# Patient Record
Sex: MALE | Race: WHITE | NOT HISPANIC OR LATINO | Employment: FULL TIME | URBAN - METROPOLITAN AREA
[De-identification: names, ages, dates, MRNs, and addresses within clinical notes are randomized per-mention and may not be internally consistent; named-entity substitution may affect disease eponyms.]

---

## 2019-02-07 ENCOUNTER — OFFICE VISIT - HEALTHEAST (OUTPATIENT)
Dept: FAMILY MEDICINE | Facility: CLINIC | Age: 29
End: 2019-02-07

## 2019-02-07 DIAGNOSIS — K64.9 HEMORRHOIDS, UNSPECIFIED HEMORRHOID TYPE: ICD-10-CM

## 2019-02-07 DIAGNOSIS — Z00.00 ROUTINE MEDICAL EXAM: ICD-10-CM

## 2019-02-07 ASSESSMENT — MIFFLIN-ST. JEOR: SCORE: 1772.75

## 2020-01-10 ENCOUNTER — COMMUNICATION - HEALTHEAST (OUTPATIENT)
Dept: SCHEDULING | Facility: CLINIC | Age: 30
End: 2020-01-10

## 2020-01-13 ENCOUNTER — COMMUNICATION - HEALTHEAST (OUTPATIENT)
Dept: FAMILY MEDICINE | Facility: CLINIC | Age: 30
End: 2020-01-13

## 2020-01-13 DIAGNOSIS — Z11.3 SCREEN FOR SEXUALLY TRANSMITTED DISEASES: ICD-10-CM

## 2020-01-17 ENCOUNTER — COMMUNICATION - HEALTHEAST (OUTPATIENT)
Dept: SCHEDULING | Facility: CLINIC | Age: 30
End: 2020-01-17

## 2020-01-17 ENCOUNTER — AMBULATORY - HEALTHEAST (OUTPATIENT)
Dept: LAB | Facility: CLINIC | Age: 30
End: 2020-01-17

## 2020-01-17 DIAGNOSIS — Z11.3 SCREEN FOR SEXUALLY TRANSMITTED DISEASES: ICD-10-CM

## 2020-01-17 LAB — HIV 1+2 AB+HIV1 P24 AG SERPL QL IA: NEGATIVE

## 2021-06-02 VITALS — WEIGHT: 174.3 LBS | HEIGHT: 71 IN | BODY MASS INDEX: 24.4 KG/M2

## 2021-06-05 NOTE — TELEPHONE ENCOUNTER
Reached out to patient and left a message asking that he contact me at: [phone] to assist with scheduling a lab only appointment.  Thank you, Constance Cordon

## 2021-06-05 NOTE — TELEPHONE ENCOUNTER
L testicle pain and scrotal swelling since Monday       No trauma / injury       Yes swelling of the scrotum      A/P:   > Should be seen at ED now       He will go       Jake Rios RN   Triage and Medication Refills          Reason for Disposition    Scrotum is painful or tender to touch    Protocols used: SCROTUM SWELLING-A-AH

## 2021-06-05 NOTE — TELEPHONE ENCOUNTER
Patient calling -says he was told his HIV test results were in.  Confirmed with patient HIV results from today show negative.  Advised patient that if there is more to this test and/or result, his provider will review it and someone will contact him with results via phone or mail.    Niesha Powers RN  Triage Nurse Advisor    Reason for Disposition    [1] Other NON-URGENT information for PCP AND [2] does not require PCP response    Protocols used: PCP CALL - NO TRIAGE-A-

## 2021-06-09 ENCOUNTER — OFFICE VISIT - HEALTHEAST (OUTPATIENT)
Dept: FAMILY MEDICINE | Facility: CLINIC | Age: 31
End: 2021-06-09

## 2021-06-09 DIAGNOSIS — R20.0 NUMBNESS AND TINGLING IN LEFT ARM: ICD-10-CM

## 2021-06-09 DIAGNOSIS — M54.12 CERVICAL RADICULOPATHY: ICD-10-CM

## 2021-06-09 DIAGNOSIS — M62.838 TRAPEZIUS MUSCLE SPASM: ICD-10-CM

## 2021-06-09 DIAGNOSIS — R20.2 NUMBNESS AND TINGLING IN LEFT ARM: ICD-10-CM

## 2021-06-09 DIAGNOSIS — M25.512 TRIGGER POINT OF SHOULDER REGION, LEFT: ICD-10-CM

## 2021-06-09 RX ORDER — CYCLOBENZAPRINE HCL 5 MG
5-10 TABLET ORAL 3 TIMES DAILY PRN
Qty: 30 TABLET | Refills: 0 | Status: SHIPPED | OUTPATIENT
Start: 2021-06-09 | End: 2023-03-14

## 2021-06-16 ENCOUNTER — COMMUNICATION - HEALTHEAST (OUTPATIENT)
Dept: FAMILY MEDICINE | Facility: CLINIC | Age: 31
End: 2021-06-16

## 2021-06-16 DIAGNOSIS — M62.838 TRAPEZIUS MUSCLE SPASM: ICD-10-CM

## 2021-06-16 DIAGNOSIS — M54.12 CERVICAL RADICULOPATHY: ICD-10-CM

## 2021-06-16 DIAGNOSIS — R20.0 NUMBNESS AND TINGLING IN LEFT ARM: ICD-10-CM

## 2021-06-16 DIAGNOSIS — M25.512 TRIGGER POINT OF SHOULDER REGION, LEFT: ICD-10-CM

## 2021-06-16 DIAGNOSIS — R20.2 NUMBNESS AND TINGLING IN LEFT ARM: ICD-10-CM

## 2021-06-16 PROBLEM — K64.9 HEMORRHOIDS, UNSPECIFIED HEMORRHOID TYPE: Status: ACTIVE | Noted: 2019-02-07

## 2021-06-20 ENCOUNTER — HEALTH MAINTENANCE LETTER (OUTPATIENT)
Age: 31
End: 2021-06-20

## 2021-06-23 NOTE — PROGRESS NOTES
MALE PREVENTATIVE EXAM    Assessment and Plan:       1. Routine medical exam    Generally the patient is doing very well.  We discussed healthy lifestyles, including adequate exercise (3-4 times a week for 20-30 minutes), and a healthy diet.  Patient should return for annual physicals, and we can also see them here as needed.       2. Hemorrhoids, unspecified hemorrhoid type    I reassured him that I think that this is pretty minor and he can use some topical agents on this as needed.  He is very comfortable with this explanation and will let me know what seems to be getting worse instead of better.        Next follow up:  No Follow-up on file.    Immunization Review  Adult Imm Review: No immunizations due today    I discussed the following with the patient:   Adult Healthy Living: Importance of regular exercise  Healthy nutrition  Getting adequate sleep  Stress management  Use of seat belts      Subjective:   Chief Complaint: Marvin Graves is an 28 y.o. male here for a preventative health visit.     HPI: 28-year-old who is here today for a get acquainted visit and a physical.  He is generally in very good health.  He describes some mild rectal bleeding that happens only very occasionally.  It is always bright red in color and very short lasting.  He associates it with straining a bit too much of the stool and/or drinking a little bit too much alcohol though couple of days prior.    Generally otherwise he is in good health has no other questions or concerns today.    Patient is new patient to the clinic. Please see past medical history, surgical history, social history and family history, all of which were completed in their entirety today.     Healthy Habits  Are you taking a daily aspirin? No  Do you typically exercising at least 40 min, 3-4 times per week?  NO  Do you usually eat at least 4 servings of fruit and vegetables a day, include whole grains and fiber and avoid regularly eating high fat foods?  "Yes  Have you had an eye exam in the past two years? Yes  Do you see a dentist twice per year? Yes  Do you have any concerns regarding sleep? YES, has always had trouble falling and staying asleep    Safety Screen  If you own firearms, are they secured in a locked gun cabinet or with trigger locks? Yes  No Data Recorded    Review of Systems:  Please see above.  The rest of the review of systems are negative for all systems.     Cancer Screening     Patient has no health maintenance due at this time          Patient Care Team:  Jass Anguiano MD as PCP - General (Family Medicine)        History     Reviewed By Date/Time Sections Reviewed    Jass Anguiano MD 2/7/2019 12:52 PM Medical, Surgical, Tobacco, Alcohol, Drug Use, Sexual Activity, Family            Objective:   Vital Signs:   Visit Vitals  BP 92/62 (Patient Site: Left Arm, Patient Position: Sitting, Cuff Size: Adult Regular)   Pulse 72   Ht 5' 11\" (1.803 m)   Wt 174 lb 4.8 oz (79.1 kg)   BMI 24.31 kg/m           PHYSICAL EXAM    Well developed, well nourished, no acute distress.  HEENT: normocephalic/atraumatic, PERRLA/EOMI, TMs: Gray, normal light reflex, no nasal discharge.  Oral mucosa: no erythema/exudate  Neck: No LAD/masses/thyromegaly/bruits  Lungs: clear bilaterally  Heart: regular rate and rhythm, no murmurs/gallops/rubs.  Abdomen: Normal bowel sounds, soft, non-tender, non-distended, no masses, neg Bojorquez's/McBurney's, no rebound/guarding  Lymphatics: no supraclavicular/axillary/epitrochlear/inguinal LAD. No edema.  Neuro: A&O x 3, CN II-XII intact, strength 5/5, reflexes symmetric, sensory intact to light touch.  Psych: Behavior appropriate, engaging.  Thought processes congruent, non-tangential.  Musculoskeletal: no gross deformities.  Skin: no rashes or lesions.            Medication List      as of 2/7/2019  1:50 PM     You have not been prescribed any medications.         Additional Screenings Completed Today:     "

## 2021-06-28 ENCOUNTER — HOSPITAL ENCOUNTER (OUTPATIENT)
Dept: PHYSICAL MEDICINE AND REHAB | Facility: CLINIC | Age: 31
Discharge: HOME | End: 2021-06-28
Attending: FAMILY MEDICINE
Payer: COMMERCIAL

## 2021-06-28 ENCOUNTER — OFFICE VISIT - HEALTHEAST (OUTPATIENT)
Dept: PHYSICAL THERAPY | Facility: REHABILITATION | Age: 31
End: 2021-06-28

## 2021-06-28 DIAGNOSIS — M54.12 CERVICAL RADICULITIS: ICD-10-CM

## 2021-06-28 DIAGNOSIS — R20.2 PARESTHESIA: ICD-10-CM

## 2021-06-28 DIAGNOSIS — M79.18 MYOFASCIAL PAIN: ICD-10-CM

## 2021-06-28 ASSESSMENT — MIFFLIN-ST. JEOR: SCORE: 1821.28

## 2021-07-06 VITALS
WEIGHT: 185 LBS | BODY MASS INDEX: 25.8 KG/M2 | DIASTOLIC BLOOD PRESSURE: 72 MMHG | OXYGEN SATURATION: 99 % | SYSTOLIC BLOOD PRESSURE: 131 MMHG | RESPIRATION RATE: 16 BRPM | HEART RATE: 73 BPM

## 2021-07-06 VITALS — BODY MASS INDEX: 25.9 KG/M2 | WEIGHT: 185 LBS | HEIGHT: 71 IN

## 2021-07-07 NOTE — PATIENT INSTRUCTIONS - HE
[facility] Scheduling    Please call [phone] to schedule your image(s) (select option #1). There are 2 different locations, see below. You can do walk-in visits for xray only images if you want.     [facility] [place]  [address] [place], [place] [id number]      [place] [id number] [facility] [address]    An order for physical therapy has been provided today.  Someone will call you to schedule physical therapy or you can call [phone] to schedule physical therapy.  It will be very important for you to do your physical therapy exercises on a regular basis to decrease your pain and prevent future flares of pain.

## 2021-07-07 NOTE — PROGRESS NOTES
[facility]   Cervical Thoracic Initial Evaluation    Patient Name: Marvin Graves  Date of evaluation: 6/28/2021  Referral Diagnosis: Cervical radiculitis  Referring provider: Amelia Elder PA-C  Visit Diagnosis:     ICD-10-CM    1. Cervical radiculitis  M54.12        Assessment:       Marvin is a 30 year old male who woke up one morning March 2021 with left neck and left arm pain and tingling intermittently to the left thumb. He reports neck extension, left neck rotation, and arms length reaching all increase left arm symptoms and if he does not modify position they extend to his left thumb. Patient denies any weakness of the left upper extremity, and denies any injury or previous left neck or shoulder problem. Patient does have a + left Sperlings test. Left UE tingling is produced with neck extension and left rotation- both reduced in AROM. Patient is able to resolve his symptoms by changing his position, and denies any interference with sleep.  Pt. is a good candidate for skilled PT services to improve pain levels and function.    Goals:  Pt. will demonstrate/verbalize independence in self-management of condition in : 6 weeks    Pt will: have no left UE symptoms of pain or tingling in 6 weeks      Patient's expectations/goals are realistic.    Barriers to Learning or Achieving Goals:  No Barriers.    Goals and plan of care were set in collaboration with patient. Plan of care is dynamic and will be modified on an ongoing basis.     Plan / Patient Instructions:        Plan of Care:   Authorization / Certification Start Date: 06/28/21  Authorization / Certification Number of Visits: 8  Communication with: Referral Source  Patient Related Instruction: Nature of Condition;Treatment plan and rationale;Self Care instruction;Basis of treatment;Body mechanics;Posture;Precautions;Expected outcome  Times per Week: 1  Number of Weeks: 6  Number of Visits: 6  Discharge Planning: Self management,  HEP  Precautions / Restrictions : None known  Therapeutic Exercise: Stretching;Strengthening  Neuromuscular Reeducation: posture;kinesio tape  Manual Therapy: soft tissue mobilization;myofascial release;strain counterstrain  Functional Training (ADL's): self care;ADL's;ergonomics  Equipment: theraband      Plan for next visit: Perform Adson's and Monserrat test to further exam for TOS symptoms- check shoulder strength as well. Patient may have fascial dysfunction of the left lower rib cage that may need treatment. 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 male who presents to therapy today with complaints of left neck and upper extremity pain and tingling into left thumb intermittently . Date of onset/duration of symptoms is March 2021 Onset was sudden. Symptoms are slightly less intense the last 2 weeks. He denies history of similar symptoms. He describes their previous level of function as not limited    Pain Rating:3  Pain rating at best: 0  Pain rating at worst: 4  Pain description: Left upper trap and down the outside of the shoulder and down the outside of the arm to thumb intermittently, tingling also intermittently in the upper arm in same pattern. No previous injury - shoulder or neck. Played football in high school    No past medical history on file.     No past surgical history on file.     Functional limitations are described as occurring with:   Reaching at arms length  Look up at ceiling  Turning head to the left      Patient reports previous chiropractic visit x 2 , steroid injection to left shoulder blade. Patient using a mm relaxant - equivocal relief, but less frequently down the left arm.          Objective:      Note: Items left blank indicates the item was not performed or not indicated at the time of the evaluation.    Patient Outcome Measures :    Neck Disability Score in %: 12     Scores range from 0-100%, where a score of 0% represents minimal pain and maximal function. The minmal clinically important difference is a score reduction of 10%.    Cervical Thoracic Examination  1. Cervical radiculitis       Involved side: Left  Posture Observation:     WNL, but left scapula is depressed and protracted, pelvis level    Cervical ROM:    Date:      *Indicate scale AROM AROM AROM   Cervical Flexion Nl, 2 fingers chin to chest     Cervical Extension 50% loss with pain and tingling into left UE during position      Right Left Right Left Right Left   Cervical Sidebending         Cervical Rotation 74 deg 55 deg with sx into left arm       Cervical Protraction      Cervical Retraction      Thoracic Flexion      Thoracic Extension      Thoracic Sidebending         Thoracic Rotation           Strength     Date:      Cervical Myotomes/5 Right Left Right Left Right Left   Cervical Flexion (C1-2)         Cervical Sidebending (C3)         Shoulder Elevation (C4)         Shoulder Abduction (C5)         Elbow Flexion (C6)         Elbow Extension (C7)         Wrist Flexion (C7) 5 5       Wrist Extension (C6) 5 5       Thumb abduction (C8) 5 5       Finger Abduction (T1)           Sensation       Reflex Testing  Cervical Dermatomes Right Left UE Reflexes Right Left   Back of the Head (C2)   Biceps (C5-6)     Supraclavicular Fossa (C3)   Brachioradialis (C5-6)     AC Joint (C4)   Triceps (C7-8)     Lateral Biceps (C5)  nl Ke s test     Palmar Thumb (C6)  nl LE Reflexes     Palmar 3rd Finger (C7)  nl Patellar (L3-4)     Palmar 5th Finger (C8)   Achilles (S1-2)     Ulnar Forearm (T1)   Babinski Response         Palpation: Cranial scan + cervical PLL on left , negative sinuvertebral of neck, negative somatic Left UE  neural and- check ribs    Cervical Special Tests     Cervical Special Tests Right Left UE Nerve Mobility Right Left   Cervical compression   Median nerve     Cervical distraction   Ulnar nerve     Spurling s test  + Radial nerve     Shoulder abduction sign   Thoracic outlet     Deep neck flexor endurance test   Monserrat     Upper cervical rotation   Adson s     Sharper-Tim   Cervical rotation lateral flexion     Alar ligament test   Other:     Other:   Other:       UE Screen: WFL    Treatment Today     TREATMENT MINUTES COMMENTS   Evaluation 30 Neck   Self-care/ Home management 14 Discussed ergonomics and sleep positioning    Manual therapy     Neuromuscular Re-education     Therapeutic Activity     Therapeutic Exercises 16 Issued and performed neck extension in standing and supine  Exercises:  Exercise #1: Standing neck extension and retraction- as tolerated and avoiding left UE sx- 2x/hour  Comment #1: Supine Neck extension on bed- as tolerated and avoiding left UE sx- 1-5 min , 2x/day      Gait training     Modality__________________                Total 60    Blank areas are intentional and mean the treatment did not include these items.     PT Evaluation Code: (Please list factors)  Patient History/Comorbidities: Neck and Left arm tingling  Examination: Neck  Clinical Presentation: stable   Clinical Decision Making: low    Patient History/  Comorbidities Examination  (body structures and functions, activity limitations, and/or participation restrictions) Clinical Presentation Clinical Decision Making (Complexity)   No documented Comorbidities or personal factors 1-2 Elements Stable and/or uncomplicated Low   1-2 documented comorbidities or personal factor 3 Elements Evolving clinical presentation with changing characteristics Moderate   3-4 documented comorbidities or personal factors 4 or more Unstable and unpredictable High             Raysa Lmeon, PT  6/28/2021  1:30 PM

## 2021-07-22 NOTE — PROGRESS NOTES
ASSESSMENT: Marvin Graves is a 30 y.o. male who is otherwise healthy who presents today for new patient evaluation of a 3-month history of left neck pain and left upper extremity paresthesias.  Patient has not had any advanced imaging of the cervical spine.  Apparently he had x-rays at a chiropractor which showed some subluxation at multiple levels.  I am concerned that the patient has a disc bulge with left C6 compromise based on the distribution of his symptoms.  Symptoms are interfering with his ability to work as a .  Symptoms have been refractory to conservative treatments thus far including chiropractic treatment x2, trigger point injection, home exercise program prescribed by a DO, and muscle relaxers.  Within the differential is also thoracic outlet syndrome as he does have increased symptoms with overhead reaching, but I think this is less likely as he can also reproduce upper extremity symptoms with cervical extension alone and left lateral rotation of the neck.      NDI: 14  Who 5: 18    PLAN:  A shared decision making model was used.  The patient's values and choices were respected.  The following represents what was discussed and decided upon by the physician assistant and the patient.      1.  DIAGNOSTIC TESTS: I ordered an MRI cervical spine for further evaluation.  -If the MRI cervical spine does not show left C6 compromise, I recommend an EMG left upper extremity versus additional imaging of the left thoracic outlet/brachial plexus.    2.  PHYSICAL THERAPY:  Discussed the importance of core strengthening, ROM, stretching exercises with the patient and how each of these entities is important in decreasing pain.  Explained to the patient that the purpose of physical therapy is to teach the patient a home exercise program.  These exercises need to be performed every day in order to decrease pain and prevent future occurrences of pain.   I entered a referral to physical  therapy.    3.  MEDICATIONS:  -I offered gabapentin.  He declined for now.  If symptoms persist, we can trial this medication.  -Patient can continue take cyclobenzaprine as needed.  -Of note, patient is planning on donating a kidney to his sister in the near future, so we will avoid medications which could affect kidney function.    4.  INTERVENTIONS: No interventions were ordered.  -We will await the results of the MRI.  If there is left C6 impingement and patient fails to improve with conservative measures, I think he could benefit from an epidural steroid injection.  -Patient had a trigger point injection at his primary care clinic June 9, 2021 which provided some improvement in the muscle tension in his left neck, but no change in his paresthesias.  We could try additional trigger point injections if needed.  He does appear to have a large component of myofascial pain.  -We can also consider osteopathic manipulation if symptoms persist.    5.  PATIENT EDUCATION: Patient is in agreement the above plan.  All questions were answered.    6.  FOLLOW-UP:   A nurse will call the patient with the results of the MRI cervical spine.  At that time I would likely recommend a trial of physical therapy for 4 weeks versus follow-up with me to review the results if he has questions or concerns in the meantime, he should not hesitate to call.        SUBJECTIVE:  Marvin Graves  Is a 30 y.o. male who presents today in consultation at request of Dr. Manzanares for new patient evaluation of left neck pain and left upper extremity paresthesias.  Patient reports that he has always had some chronic neck muscle tension/knots.  Ibuprofen was historically helpful for his symptoms.  3 months ago he woke up with more severe left neck pain and new tingling down the left arm.  He went to 2 sessions of chiropractic treatment which were not helpful.  He had an x-ray at the chiropractic clinic which apparently showed some subluxations.  He then  saw Dr. Manzanares and had a trigger point injection June 9 which helped somewhat with muscle tension, but did not provide relief of his paresthesias.  The paresthesias are bothersome at work.  He is a  and has to do a lot of overhead reaching/work and cervical extension which reproduces the symptoms.  Left neck pain.    Patient complains of pain is located in the left lower cervical region and extends into the upper trapezius muscle.  He denies any pain down the arm but he has numbness and tingling that radiates from the left shoulder, down the left bicep/lateral upper arm into the left radial forearm, ending in the left thumb.  He can reproduce the paresthesias down the arm with cervical extension and reaching his left arm overhead.  Turning his neck aggravates the pinching pain in the left neck.  Symptoms are alleviated with keeping his arms down in his neck neutral.  Patient denies any weakness in the left arm.  He denies any right-sided symptoms.  He denies any headaches.  He denies increased paresthesias at night.  He denies any color or temperature changes in the left upper extremity.  Denies any swelling left upper extremity.  He denies any loss of bowel or bladder control.  Denies any recent fevers, chills, or sweats.    Patient was given a home exercise program by Dr. Manzanares.  He states that the exercises are similar to stretch as he was already doing on his own so he has not noticed any significant improvement.  As mentioned above, he tried chiropractic treatment x2.  He tried a trigger point injection June 9.  He has never had any spine surgery.  He uses Flexeril which helps somewhat.  He is not taking any additional pain relief medications.    Current Outpatient Medications on File Prior to Visit   Medication Sig Dispense Refill     cyclobenzaprine (FLEXERIL) 5 MG tablet Take 1-2 tablets (5-10 mg total) by mouth 3 (three) times a day as needed for muscle spasms. 30 tablet 0         No  Known Allergies        Patient Active Problem List   Diagnosis     Hemorrhoids, unspecified hemorrhoid type       Surgical history: Finger surgery 2008    Family History   Problem Relation Age of Onset     No Medical Problems Mother      Cancer Father         CLL     Diabetes Paternal Grandmother      No Medical Problems Paternal Grandfather        Social history: Patient is engaged.  He works at [employer] as a .  He denies tobacco use.  He drinks several alcoholic beverages per week.  Denies illicit drug use.    ROS:    Specifically negative for dysphagia, imbalance, fine motor skill difficulties, bowel/bladder dysfunction, fevers,chills, appetite changes, unexplained weight loss.   Otherwise 13 systems reviewed are negative.  Please see the patient's intake questionnaire from today for details.      OBJECTIVE:  PHYSICAL EXAMINATION:  CONSTITUTIONAL:  Vital signs as above.  No acute distress.  The patient is well nourished and well groomed.  PSYCHIATRIC:  The patient is awake, alert, oriented to person, place, time and answering questions appropriately with clear speech.    HEENT:  Normocephalic, atraumatic.  Sclera clear.    SKIN:  Skin over the face, bilateral upper extremities, and neck is clean, dry, intact without rashes.  LYMPH NODES:  No palpable or tender anterior/posterior cervical, submandibular, or supraclavicular lymph nodes.    MUSCLE STRENGTH:  5/5 strength for the bilateral shoulder abductors, elbow flexors/extensors, wrist extensors, finger flexors/abductors.  NEURO:  CN III-XII are grossly intact.  2+ symmetric biceps, brachioradialis, triceps reflexes bilaterally.  Sensation to light touch intact bilateral upper extremities throughout.  Negative Cunningham's bilaterally.    VASCULAR:  2/4 radial pulses bilaterally.  Warm upper limbs bilaterally.  Capillary refill in the upper extremities is less than 1 second.  MUSCULOSKELETAL: Tender to palpation with hypertonicity left upper trapezius  muscles and lower left cervical paraspinous muscles.  Cervical flexion intact.  Cervical extension mildly restricted.  Reproduction of left upper extremity paresthesias with cervical extension.  Lateral rotation left mildly restricted with reproduction of paresthesias down left arm.  Lateral rotation right within normal limits.  Patient can also reproduce left upper extremity paresthesias with raising his left arm overhead.  Range of motion of left shoulder is full.

## 2021-07-27 ENCOUNTER — HOSPITAL ENCOUNTER (OUTPATIENT)
Dept: PHYSICAL THERAPY | Facility: REHABILITATION | Age: 31
End: 2021-07-27
Payer: COMMERCIAL

## 2021-07-27 DIAGNOSIS — M54.12 CERVICAL RADICULITIS: Primary | ICD-10-CM

## 2021-07-27 PROCEDURE — 97110 THERAPEUTIC EXERCISES: CPT | Mod: GP | Performed by: PHYSICAL THERAPIST

## 2021-07-27 PROCEDURE — 97140 MANUAL THERAPY 1/> REGIONS: CPT | Mod: GP | Performed by: PHYSICAL THERAPIST

## 2021-08-03 NOTE — PROGRESS NOTES
Progress Notes by Raysa Lemon, PT at 6/28/2021  2:30 PM     Author: Raysa Lemon PT Service: -- Author Type: Physical Therapist    Filed: 8/3/2021 10:14 AM Encounter Date: 6/28/2021 Status: Signed    : Raysa Lemon PT (Physical Therapist)       [facility] Discharge Summary  Patient Name: Marvin Graves  Date: 8/3/2021  Referral Diagnosis: Cervical Radiculitis  Referring provider: Amelia Elder PA-C  Visit Diagnosis:   1. Cervical radiculitis         Goals:  Pt. will demonstrate/verbalize independence in self-management of condition in : 6 weeks    Pt will: have no left UE symptoms of pain or tingling in 6 weeks  Progress lost to follow up- pt did not continue PT care.    Patient was seen for 1 visit on 6/28/21 with no missed appointments.    Therapy will be discontinued at this time.  The patient will need a new referral to resume.    Thank you for your referral.  Raysa Lemon  8/3/2021  10:12 AM

## 2021-09-10 NOTE — ADDENDUM NOTE
Encounter addended by: Mehreen Boyd, PT on: 9/10/2021 12:04 PM   Actions taken: Clinical Note Signed, Episode resolved

## 2021-09-10 NOTE — PROGRESS NOTES
Outpatient Physical Therapy Discharge Note     Patient: Marvin Graves  : 1990    Beginning/End Dates of Reporting Period:  21 to 9/10/2021    Referring Provider: LUCILA Still    Therapy Diagnosis: Cervical Radiculitis      Client Self Report: Patient reports he has been doing ok, likely avoiding positions that increased pain but does feel they are better as well overall. has been busy and not 100% compliant with HEP but has been doing them as often as he can.     Objective Measurements:  Objective Measure: Cervical ROM   Details: pain and numbness with end range rotation and extension to the L side   Objective Measure: Neural tension tension   Details: negative for median, radial and ulnar nerves on the L side   Objective Measure: TOS testing  Details: mild positive Cody today      Goals:  Goal Identifier Self Management    Goal Description Pt. will demonstrate/verbalize independence in self-management of condition in : 6 weeks   Target Date 21   Date Met      Progress (detail required for progress note):       Goal Identifier Tingling    Goal Description Pt will: have no left UE symptoms of pain or tingling in 6 weeks   Target Date 21   Date Met      Progress (detail required for progress note):       Plan:  Discharge from therapy.    Discharge:    Reason for Discharge: Patient chooses to discontinue therapy.  Patient cancelled follow up appoint and did not return to therapy. discharge today     Equipment Issued: none   Mehreen Boyd PT, DPT, CLT-ISIDRO     Discharge Plan: Patient to continue home program.

## 2021-09-30 ENCOUNTER — LAB (OUTPATIENT)
Dept: LAB | Facility: CLINIC | Age: 31
End: 2021-09-30
Payer: COMMERCIAL

## 2021-09-30 DIAGNOSIS — F12.90 CANNABIS USE, UNSPECIFIED, UNCOMPLICATED: Primary | ICD-10-CM

## 2021-09-30 LAB
Lab: NORMAL
PERFORMING LABORATORY: NORMAL
SPECIMEN STATUS: NORMAL
TEST NAME: NORMAL

## 2021-09-30 PROCEDURE — 80307 DRUG TEST PRSMV CHEM ANLYZR: CPT | Mod: 90

## 2021-09-30 PROCEDURE — 99000 SPECIMEN HANDLING OFFICE-LAB: CPT

## 2021-10-02 LAB — MAYO MISC RESULT: NORMAL

## 2021-10-11 ENCOUNTER — HEALTH MAINTENANCE LETTER (OUTPATIENT)
Age: 31
End: 2021-10-11

## 2021-10-11 ENCOUNTER — MYC MEDICAL ADVICE (OUTPATIENT)
Dept: FAMILY MEDICINE | Facility: CLINIC | Age: 31
End: 2021-10-11

## 2021-10-13 NOTE — TELEPHONE ENCOUNTER
Patient was informed of the urine results from [facility]. This was a scanned document so that the patient would not view the results.

## 2021-10-25 DIAGNOSIS — F12.90 CANNABIS USE, UNSPECIFIED, UNCOMPLICATED: Primary | ICD-10-CM

## 2021-10-29 ENCOUNTER — LAB (OUTPATIENT)
Dept: LAB | Facility: CLINIC | Age: 31
End: 2021-10-29
Payer: COMMERCIAL

## 2021-10-29 DIAGNOSIS — F12.90 CANNABIS USE, UNSPECIFIED, UNCOMPLICATED: ICD-10-CM

## 2021-10-29 PROCEDURE — 99000 SPECIMEN HANDLING OFFICE-LAB: CPT

## 2021-10-29 PROCEDURE — 80307 DRUG TEST PRSMV CHEM ANLYZR: CPT | Mod: 90

## 2021-11-01 LAB
Lab: NORMAL
PERFORMING LABORATORY: NORMAL
SPECIMEN STATUS: NORMAL
TEST NAME: NORMAL

## 2021-11-02 LAB — MAYO MISC RESULT: NORMAL

## 2022-07-17 ENCOUNTER — HEALTH MAINTENANCE LETTER (OUTPATIENT)
Age: 32
End: 2022-07-17

## 2022-09-25 ENCOUNTER — HEALTH MAINTENANCE LETTER (OUTPATIENT)
Age: 32
End: 2022-09-25

## 2023-03-14 ENCOUNTER — OFFICE VISIT (OUTPATIENT)
Dept: FAMILY MEDICINE | Facility: CLINIC | Age: 33
End: 2023-03-14
Payer: COMMERCIAL

## 2023-03-14 VITALS
RESPIRATION RATE: 16 BRPM | OXYGEN SATURATION: 100 % | HEIGHT: 71 IN | SYSTOLIC BLOOD PRESSURE: 120 MMHG | HEART RATE: 83 BPM | WEIGHT: 178 LBS | BODY MASS INDEX: 24.92 KG/M2 | DIASTOLIC BLOOD PRESSURE: 82 MMHG | TEMPERATURE: 97.9 F

## 2023-03-14 DIAGNOSIS — M54.12 CERVICAL RADICULOPATHY: Primary | ICD-10-CM

## 2023-03-14 DIAGNOSIS — M62.838 TRAPEZIUS MUSCLE SPASM: ICD-10-CM

## 2023-03-14 PROCEDURE — 99214 OFFICE O/P EST MOD 30 MIN: CPT | Performed by: NURSE PRACTITIONER

## 2023-03-14 RX ORDER — PREDNISONE 10 MG/1
TABLET ORAL
Qty: 30 TABLET | Refills: 0 | Status: SHIPPED | OUTPATIENT
Start: 2023-03-14 | End: 2023-10-09

## 2023-03-14 RX ORDER — CYCLOBENZAPRINE HCL 5 MG
5-10 TABLET ORAL 3 TIMES DAILY PRN
Qty: 30 TABLET | Refills: 0 | Status: SHIPPED | OUTPATIENT
Start: 2023-03-14 | End: 2023-10-09

## 2023-03-14 ASSESSMENT — PAIN SCALES - GENERAL: PAINLEVEL: MILD PAIN (3)

## 2023-03-14 NOTE — PATIENT INSTRUCTIONS
I went ahead and sent the refill of the cyclobenzaprine to your pharmacy.  Watch out for sedation and no driving/alcohol with this.    I also sent the prednisone taper to see if that can help some of the discomfort symptoms.  Remember, this is best tolerated when taken with breakfast.  Potential side effects include jitteriness, upset stomach, sleep disturbances    My initial suspicion of the mass/lump in the shoulder would be a cyst given how they feel today.  If wanting definitive answers we could get an MRI.  You can continue to keep an eye on this if you want but if they become larger, more painful, etc. let me know.

## 2023-03-14 NOTE — PROGRESS NOTES
Assessment & Plan     ICD-10-CM    1. Cervical radiculopathy  M54.12 predniSONE (DELTASONE) 10 MG tablet      2. Trapezius muscle spasm  M62.838 cyclobenzaprine (FLEXERIL) 5 MG tablet          Refill of cyclobenzaprine sent to the pharmacy.  Potential for cervical radiculopathy as well.  We will try taper of prednisone to see if this provides any benefit.  Discussed with the patient definitive answers with cervical/thoracic MRI.  He will think about it.  He is going to work on his at home exercises as well which is reasonable.  Lumps noted on both shoulders.  Given symptoms and appearance, suspect cyst versus lipoma.  Discussed MRI for definitive answers.  He will think about this and get back to me.     Reviewed family medicine note x2    Reviewed     Subjective     HPI     Answers for HPI/ROS submitted by the patient on 3/14/2023  Your back pain is: recurring  Where is your back pain located? : right upper back, left upper back  How would you describe your back pain? : dull ache  Where does your back pain spread? : nowhere  Since you noticed your back pain, how has it changed? : unchanged  Does your back pain interfere with your job?: No  How many servings of fruits and vegetables do you eat daily?: 0-1  On average, how many sweetened beverages do you drink each day (Examples: soda, juice, sweet tea, etc.  Do NOT count diet or artificially sweetened beverages)?: 0  How many minutes a day do you exercise enough to make your heart beat faster?: 9 or less  How many days a week do you exercise enough to make your heart beat faster?: 3 or less  How many days per week do you miss taking your medication?: 0      Left shoulder lump  First noticed last week while getting a massage. No pain.  No other lumps on the solorzano.  Another over the right scapula for 1-2 months.  No redness or drainage.  Works as an .      Neck pain  Previously saw Dr. Anguiano and .  Last visit in June 2021.  There was talk of  "potential cervical radiculopathy.  MRI and EMG was discussed as possible options.  Patient has been doing okay, but the pain has started to move from the left to the right side of the neck.  Previous numbness in the fingers is resolved.  Atraumatic.  Did do PT.  Wonders about other options.        Review of Systems - negative except for what's listed in the HPI      Objective    /82   Pulse 83   Temp 97.9  F (36.6  C)   Resp 16   Ht 1.803 m (5' 11\")   Wt 80.7 kg (178 lb)   SpO2 100%   BMI 24.83 kg/m    Physical Exam   General appearance - alert, well appearing, and in no distress  Mental status - alert, oriented to person, place, and time  Neurological -grossly intact  Musculoskeletal -over the left shoulder there is a firm palpable lump measuring 1.5 cm in diameter.  Mobile.  No surrounding erythema.  No tenderness to palpation.  On the right shoulder there is a small marble sized lump.  Feels firmer.  No fluctuance.  Extremities - peripheral pulses normal, no peripheral edema  Skin - normal coloration and turgor.    Silver Amin, CNP    This note has been dictated using voice recognition software. Any grammatical or context distortions are unintentional and inherent to the software.    "

## 2023-08-05 ENCOUNTER — HEALTH MAINTENANCE LETTER (OUTPATIENT)
Age: 33
End: 2023-08-05

## 2023-10-02 ASSESSMENT — ENCOUNTER SYMPTOMS
ARTHRALGIAS: 0
SHORTNESS OF BREATH: 0
JOINT SWELLING: 0
HEMATURIA: 0
COUGH: 0
PARESTHESIAS: 0
HEARTBURN: 0
NAUSEA: 0
ABDOMINAL PAIN: 0
DYSURIA: 0
EYE PAIN: 0
DIARRHEA: 0
DIZZINESS: 0
CHILLS: 0
NERVOUS/ANXIOUS: 0
FREQUENCY: 0
MYALGIAS: 0
HEADACHES: 0
SORE THROAT: 0
FEVER: 0
HEMATOCHEZIA: 0
WEAKNESS: 0
CONSTIPATION: 0
PALPITATIONS: 0

## 2023-10-09 ENCOUNTER — OFFICE VISIT (OUTPATIENT)
Dept: FAMILY MEDICINE | Facility: CLINIC | Age: 33
End: 2023-10-09
Payer: COMMERCIAL

## 2023-10-09 VITALS
HEIGHT: 72 IN | WEIGHT: 174 LBS | OXYGEN SATURATION: 99 % | TEMPERATURE: 97.5 F | SYSTOLIC BLOOD PRESSURE: 130 MMHG | DIASTOLIC BLOOD PRESSURE: 80 MMHG | BODY MASS INDEX: 23.57 KG/M2 | HEART RATE: 76 BPM

## 2023-10-09 DIAGNOSIS — Z00.00 ANNUAL PHYSICAL EXAM: ICD-10-CM

## 2023-10-09 DIAGNOSIS — Z52.4 KIDNEY DONOR: Primary | ICD-10-CM

## 2023-10-09 PROBLEM — K64.9 HEMORRHOIDS, UNSPECIFIED HEMORRHOID TYPE: Status: RESOLVED | Noted: 2019-02-07 | Resolved: 2023-10-09

## 2023-10-09 LAB
ERYTHROCYTE [DISTWIDTH] IN BLOOD BY AUTOMATED COUNT: 12.3 % (ref 10–15)
HCT VFR BLD AUTO: 44.6 % (ref 40–53)
HGB BLD-MCNC: 15.5 G/DL (ref 13.3–17.7)
MCH RBC QN AUTO: 31.7 PG (ref 26.5–33)
MCHC RBC AUTO-ENTMCNC: 34.8 G/DL (ref 31.5–36.5)
MCV RBC AUTO: 91 FL (ref 78–100)
PLATELET # BLD AUTO: 275 10E3/UL (ref 150–450)
RBC # BLD AUTO: 4.89 10E6/UL (ref 4.4–5.9)
WBC # BLD AUTO: 9.4 10E3/UL (ref 4–11)

## 2023-10-09 PROCEDURE — 99395 PREV VISIT EST AGE 18-39: CPT | Mod: 25 | Performed by: STUDENT IN AN ORGANIZED HEALTH CARE EDUCATION/TRAINING PROGRAM

## 2023-10-09 PROCEDURE — 80061 LIPID PANEL: CPT | Performed by: STUDENT IN AN ORGANIZED HEALTH CARE EDUCATION/TRAINING PROGRAM

## 2023-10-09 PROCEDURE — 36415 COLL VENOUS BLD VENIPUNCTURE: CPT | Performed by: STUDENT IN AN ORGANIZED HEALTH CARE EDUCATION/TRAINING PROGRAM

## 2023-10-09 PROCEDURE — 90715 TDAP VACCINE 7 YRS/> IM: CPT | Performed by: STUDENT IN AN ORGANIZED HEALTH CARE EDUCATION/TRAINING PROGRAM

## 2023-10-09 PROCEDURE — 90471 IMMUNIZATION ADMIN: CPT | Performed by: STUDENT IN AN ORGANIZED HEALTH CARE EDUCATION/TRAINING PROGRAM

## 2023-10-09 PROCEDURE — 80053 COMPREHEN METABOLIC PANEL: CPT | Performed by: STUDENT IN AN ORGANIZED HEALTH CARE EDUCATION/TRAINING PROGRAM

## 2023-10-09 PROCEDURE — 90686 IIV4 VACC NO PRSV 0.5 ML IM: CPT | Performed by: STUDENT IN AN ORGANIZED HEALTH CARE EDUCATION/TRAINING PROGRAM

## 2023-10-09 PROCEDURE — 85027 COMPLETE CBC AUTOMATED: CPT | Performed by: STUDENT IN AN ORGANIZED HEALTH CARE EDUCATION/TRAINING PROGRAM

## 2023-10-09 PROCEDURE — 90472 IMMUNIZATION ADMIN EACH ADD: CPT | Performed by: STUDENT IN AN ORGANIZED HEALTH CARE EDUCATION/TRAINING PROGRAM

## 2023-10-09 ASSESSMENT — PAIN SCALES - GENERAL: PAINLEVEL: NO PAIN (0)

## 2023-10-09 NOTE — PROGRESS NOTES
Assessment/ Plan   32 year old male With unremarkable past medical history who presents for annual physical exam.    1. Kidney donor  Donated one kidney to sister through [facility]    2. Annual physical exam  - Comprehensive metabolic panel (BMP + Alb, Alk Phos, ALT, AST, Total. Bili, TP); Future  - CBC with platelets; Future  - Lipid panel reflex to direct LDL Non-fasting; Future    Follow-up in: 1 year for physical    Roman Moya MD    Subjective:     Marvin Graves is a 32 year old male who presents for an annual exam.     Chief Complaint   Patient presents with    Physical       Immunization History   Administered Date(s) Administered    COVID-19 Bivalent 12+ (Pfizer) 10/27/2022    COVID-19 MONOVALENT 12+ (Pfizer) 03/31/2021, 04/21/2021, 11/05/2021    DTAP (<7y) 02/05/1991, 04/16/1991, 06/18/1991, 06/16/1992, 07/10/1996    HepB, Unspecified 07/10/1996, 08/16/1996, 04/11/1997    Hepatitis A (ADULT 19+) 04/28/2017    Hepatitis B, Peds 07/10/1996, 08/16/1996, 04/11/1997    MMR 03/17/1992, 07/10/1996    Meningococcal (Menomune ) 04/16/2009    Polio, Unspecified  02/05/1991, 04/16/1991, 06/16/1992, 04/10/1996, 07/10/1996    TDAP (Adacel,Boostrix) 08/20/2013    Td (Adult), Adsorbed 06/24/2003    Typhoid IM 04/28/2017     Immunization status: due today.     No current outpatient medications on file.     No past medical history on file.  Past Surgical History:   Procedure Laterality Date    FINGER CLOSED REDUCTION W/ PERCUTANEOUS PINNING      NEPHRECTOMY      kidney donation     Patient has no known allergies.  Family History   Problem Relation Age of Onset    No Known Problems Mother     Cancer Father         CLL    No Known Problems Sister     Diabetes Paternal Grandmother     No Known Problems Paternal Grandfather     Kidney failure Half-Sister      Social History     Socioeconomic History    Marital status: Single     Spouse name: Not on file    Number of children: Not on file    Years of education: Not on file     Highest education level: Not on file   Occupational History    Not on file   Tobacco Use    Smoking status: Never    Smokeless tobacco: Never   Vaping Use    Vaping Use: Never used   Substance and Sexual Activity    Alcohol use: Not Currently     Alcohol/week: 3.0 standard drinks of alcohol     Types: 3 Standard drinks or equivalent per week    Drug use: Yes     Types: Marijuana     Comment: Drug use: every other day.  goes through an eigth every other day    Sexual activity: Yes     Partners: Female     Comment: wife   Other Topics Concern    Not on file   Social History Narrative    Not on file     Social Determinants of Health     Financial Resource Strain: Low Risk  (10/2/2023)    Financial Resource Strain     Within the past 12 months, have you or your family members you live with been unable to get utilities (heat, electricity) when it was really needed?: No   Food Insecurity: Low Risk  (10/2/2023)    Food Insecurity     Within the past 12 months, did you worry that your food would run out before you got money to buy more?: No     Within the past 12 months, did the food you bought just not last and you didn t have money to get more?: No   Transportation Needs: Low Risk  (10/2/2023)    Transportation Needs     Within the past 12 months, has lack of transportation kept you from medical appointments, getting your medicines, non-medical meetings or appointments, work, or from getting things that you need?: No   Physical Activity: Not on file   Stress: Not on file   Social Connections: Not on file   Interpersonal Safety: Low Risk  (10/9/2023)    Interpersonal Safety     Do you feel physically and emotionally safe where you currently live?: Yes     Within the past 12 months, have you been hit, slapped, kicked or otherwise physically hurt by someone?: No     Within the past 12 months, have you been humiliated or emotionally abused in other ways by your partner or ex-partner?: No   Housing Stability: Low Risk   "(10/2/2023)    Housing Stability     Do you have housing? : Yes     Are you worried about losing your housing?: No         Review of Systems  Complete ROS negative except as noted in the HPI    Objective:      Vitals:    10/09/23 1320   BP: 130/80   Pulse: 76   Temp: 97.5  F (36.4  C)   SpO2: 99%   Weight: 78.9 kg (174 lb)   Height: 1.816 m (5' 11.5\")       Physical Exam:  /80   Pulse 76   Temp 97.5  F (36.4  C)   Ht 1.816 m (5' 11.5\")   Wt 78.9 kg (174 lb)   SpO2 99%   BMI 23.93 kg/m      General appearance: Alert, cooperative, no distress, appears stated age  Head: Normocephalic, atraumatic, without obvious abnormality  EARS: TM's gray dull with structures seen bilaterally  Eyes: Pupils equal round, reactive.  Conjunctiva clear.  Nose: Nares normal, no drainage.  Throat: Lips, mucosa, tongue normal mucosa pink and moist  Neck: Supple, symmetric, trachea midline, no adenopathy.  No thyroid enlargement, tenderness or nodules.    Lungs: Clear to auscultation bilaterally, no wheezing or crackles present.  Respirations unlabored  Heart: Regular rate and rhythm, normal S1 and S2, no murmur, rub or gallop.  Abdomen: Soft, nontender, nondistended.  Bowel sounds active in all 4 quadrants.  No masses or organomegaly.  Extremities: Extremities normal, atraumatic.  No cyanosis or edema.  Skin: Skin color, texture, turgor normal no rashes or lesions on limited skin exam  Neurologic: CN II through XII intact, normal strength.      No results found for any visits on 10/09/23.    Roman Rudd MD          "

## 2023-10-10 LAB
ALBUMIN SERPL BCG-MCNC: 4.6 G/DL (ref 3.5–5.2)
ALP SERPL-CCNC: 57 U/L (ref 40–129)
ALT SERPL W P-5'-P-CCNC: 39 U/L (ref 0–70)
ANION GAP SERPL CALCULATED.3IONS-SCNC: 9 MMOL/L (ref 7–15)
AST SERPL W P-5'-P-CCNC: 32 U/L (ref 0–45)
BILIRUB SERPL-MCNC: 1 MG/DL
BUN SERPL-MCNC: 17.7 MG/DL (ref 6–20)
CALCIUM SERPL-MCNC: 9.8 MG/DL (ref 8.6–10)
CHLORIDE SERPL-SCNC: 104 MMOL/L (ref 98–107)
CHOLEST SERPL-MCNC: 167 MG/DL
CREAT SERPL-MCNC: 1.27 MG/DL (ref 0.67–1.17)
DEPRECATED HCO3 PLAS-SCNC: 28 MMOL/L (ref 22–29)
EGFRCR SERPLBLD CKD-EPI 2021: 77 ML/MIN/1.73M2
GLUCOSE SERPL-MCNC: 76 MG/DL (ref 70–99)
HDLC SERPL-MCNC: 36 MG/DL
LDLC SERPL CALC-MCNC: 96 MG/DL
NONHDLC SERPL-MCNC: 131 MG/DL
POTASSIUM SERPL-SCNC: 5.4 MMOL/L (ref 3.4–5.3)
PROT SERPL-MCNC: 7.2 G/DL (ref 6.4–8.3)
SODIUM SERPL-SCNC: 141 MMOL/L (ref 135–145)
TRIGL SERPL-MCNC: 174 MG/DL

## 2023-10-10 NOTE — RESULT ENCOUNTER NOTE
Marvin,  Your results from your recent clinic visit show:  Your CMP was normal with normal electrolytes and liver function. Your creatinine was mildly elevated which is a kidney test. However, this is not concerning and he should monitor with his [place] doctors   Your lipids look ok and I used these as well as other factors from your history to calculate your 10 year risk of having something like a heart attack (ASCVD risk) and it was low risk. Just continue to work on exercise and diet to maintain this low risk.  Your CBC is normal with no anemia or signs of infection seen    If you have more questions please call the clinic at [phone] or send me a [employer] message    Dr. Roman Moya

## 2024-01-22 ENCOUNTER — MYC MEDICAL ADVICE (OUTPATIENT)
Dept: FAMILY MEDICINE | Facility: CLINIC | Age: 34
End: 2024-01-22
Payer: COMMERCIAL

## 2024-01-22 DIAGNOSIS — M54.12 CERVICAL RADICULOPATHY: Primary | ICD-10-CM

## 2024-01-23 NOTE — TELEPHONE ENCOUNTER
S-(situation): My Chart message from yesterday:  Hello,   I've been dealing with neck pain for almost four years. I went to a spinal specialist a couple years ago (thru [facility]). After discussing with the doc and doing physical therapy, the issue persists. I think the spinal specialist and myself left it at me getting an MRI but I couldn't afford one at the time. Can I get an MRI now?  Also, I currently have serious continuous neck pain/stiff neck that started after I woke up a few days ago. I thought it would pass and it hasn't. Are you able to help at all?      Kind regards,  Marvin  B-(background): When reviewing chart, he was seen by Silver Amin in March for a cyst and also addressed neck pain.  At that time the neck pain was very similar to as it is now.    Note from 3/14/23: Potential for cervical radiculopathy as well. We will try taper of prednisone to see if this provides any benefit. Discussed with the patient definitive answers with cervical/thoracic MRI. He will think about it.     A-(assessment): Patient states he does feel it is somewhat worse now and has especially be worse for the past 4 days.  Prednisone gave maybe a little relief at the time, but has not fixed the issue.  He has done no new illnesses or activities to exacerbate the pain.      R-(recommendations): Patient would like a referral or MRI to diagnose the pain.  He now in [place], and does not have a PCP - he would appreciate a recommendation for a provider in [place].    TERRIE Middleton RN  [facility]

## 2024-01-24 NOTE — TELEPHONE ENCOUNTER
Please contact patient.    I went ahead and ordered the cervical MRI that spine care talked about a few years back.  I also placed a new referral back to spine care to consult with them after the MRI is completed so they can go over results.    Silver Amin, [place]

## 2024-01-25 NOTE — TELEPHONE ENCOUNTER
Please direct to billing.    He could certainly call spine to see if they have appointments earlier, but my guess is they do not    Silver Amin NP

## 2024-01-29 NOTE — TELEPHONE ENCOUNTER
Phone call to patient to address his questions and assist in scheduling an appointment with Amelia Elder PA-C (last seen 6/28/21 as new consult). Left message to return call.

## 2024-01-31 NOTE — TELEPHONE ENCOUNTER
Phone call to patient. Patient wanting Amelia Elder PA-C input on if the imaging is appropriate or should be going lower. He reports he continues to have bilateral upper back/shoulder pain and neck pain. He has rare radicular symptoms. Explained that the MRI he is scheduled for should address his concerns. He should have the MRI and then follow up with Amelia Elder PA-C for her to review these results as well as treatment plan based on findings and her assessment. Stated understanding. Transferred to scheduling to make appointment.

## 2024-02-02 ENCOUNTER — HOSPITAL ENCOUNTER (OUTPATIENT)
Dept: MRI IMAGING | Facility: HOSPITAL | Age: 34
Discharge: HOME | End: 2024-02-02
Attending: NURSE PRACTITIONER | Admitting: NURSE PRACTITIONER
Payer: COMMERCIAL

## 2024-02-02 DIAGNOSIS — M54.12 CERVICAL RADICULOPATHY: ICD-10-CM

## 2024-02-02 PROCEDURE — 72141 MRI NECK SPINE W/O DYE: CPT

## 2024-02-06 ENCOUNTER — OFFICE VISIT (OUTPATIENT)
Dept: PHYSICAL MEDICINE AND REHAB | Facility: CLINIC | Age: 34
End: 2024-02-06
Payer: COMMERCIAL

## 2024-02-06 VITALS
SYSTOLIC BLOOD PRESSURE: 138 MMHG | HEART RATE: 77 BPM | DIASTOLIC BLOOD PRESSURE: 74 MMHG | HEIGHT: 72 IN | BODY MASS INDEX: 24.87 KG/M2 | WEIGHT: 183.6 LBS

## 2024-02-06 DIAGNOSIS — M79.18 MYOFASCIAL PAIN: ICD-10-CM

## 2024-02-06 DIAGNOSIS — M54.12 CERVICAL RADICULAR PAIN: Primary | ICD-10-CM

## 2024-02-06 DIAGNOSIS — M54.12 CERVICAL RADICULOPATHY: ICD-10-CM

## 2024-02-06 PROCEDURE — 99214 OFFICE O/P EST MOD 30 MIN: CPT | Performed by: PHYSICIAN ASSISTANT

## 2024-02-06 RX ORDER — CYCLOBENZAPRINE HCL 5 MG
5 TABLET ORAL 3 TIMES DAILY PRN
Qty: 30 TABLET | Refills: 1 | Status: SHIPPED | OUTPATIENT
Start: 2024-02-06

## 2024-02-06 RX ORDER — CYCLOBENZAPRINE HCL 5 MG
5 TABLET ORAL 3 TIMES DAILY PRN
COMMUNITY
End: 2024-02-06

## 2024-02-06 RX ORDER — GABAPENTIN 300 MG/1
CAPSULE ORAL
Qty: 90 CAPSULE | Refills: 0 | Status: SHIPPED | OUTPATIENT
Start: 2024-02-06 | End: 2024-05-06

## 2024-02-06 ASSESSMENT — PAIN SCALES - GENERAL: PAINLEVEL: MILD PAIN (3)

## 2024-02-06 NOTE — LETTER
2/6/2024         RE: Marivn Graves  [address]  [facility] [address]        Dear Colleague,    Thank you for referring your patient, Marvin Graves, to the [facility]. Please see a copy of my visit note below.    Assessment:   Marvin Graves is a 33 year old y.o. male with past medical history significant for kidney donor who presents today for follow-up regarding acute on chronic neck pain, currently worse on the right.  Pain flared up about 1 to 2 weeks ago with no trauma..  My review of an MRI cervical spine shows multilevel disc degeneration and facet arthropathy.  There is moderate left foraminal stenosis C3-4.  At C4-5 there is mild right and mild to moderate left foraminal stenosis.  At C5-6 there is a disc osteophyte complex with moderate bilateral foraminal stenosis.  Suspect pain is multifactorial.  I believe he is primarily symptomatic from the cervical radiculitis related to the moderate foraminal stenosis at C5-6.  He is secondary myofascial pain.  He may also have a component of facet mediated pain.  He is neurologically intact on exam.       Plan:     A shared decision making plan was used.  The patient's values and choices were respected.  The following represents what was discussed and decided upon by the physician assistant and the patient.      1.  DIAGNOSTIC TESTS: I reviewed the MRI cervical spine.  No additional diagnostic test were ordered.    2.  PHYSICAL THERAPY: Patient went to physical therapy in 2021.  I offered a referral to [facility].  He is going to consider this and will let me know if he wants to proceed.    3.  MEDICATIONS:  - I refilled the patient cyclobenzaprine.  - I also prescribed gabapentin 300 mg titrating up to 3 had a milligrams 3 times daily.    4.  INTERVENTIONS: No interventions were ordered today.  - We did discuss a C7-T1 interlaminar epidural steroid injection.  He will consider this.  - If that did not help we  can try a right C5-6 transforaminal epidural steroid injection.    5.  PATIENT EDUCATION: Patient is in agreement the above plan.  All questions were answered.    6.  FOLLOW-UP: Patient is going to consider his options and follow-up as needed.  He was sent me a [employer] message if he wants to pursue physical therapy and/or an epidural steroid injection.  If he has any other questions or concerns, he should not hesitate to call.    Subjective:     Marvin Graves is a 33 year old male who presents today for follow-up regarding acute on chronic neck pain.  I have not seen this patient since June 28, 2021.  Patient reports that since I saw him he participated in physical therapy which was somewhat helpful.  About a year ago his pain increased.  He treated that with cyclobenzaprine which was helpful.  Then 1 to 2 weeks ago pain flared up again.  He states that he woke up with a stiff neck.  Again he took cyclobenzaprine which was somewhat helpful but he continues have significant pain.    Patient complains of right greater than left neck pain.  On the right the pain radiates into the shoulder blade area and out to the right shoulder.  He denies any pain further down the arm.  Denies any numbness, tingling, or weakness down the arms.  Denies headaches.  He rates his pain today as a 3 out of 10.  At its worst it is an 8 out of 10.  Pain is aggravated with sitting with poor posture at the computer.  He has difficulty sleeping because of the pain.  Pain is alleviated with standing, activity, and heat.    Treatment to date:  - Physical therapy in June through July 2021.  - Chiropractic treatment.  - Trigger point injection primary care clinic in June 9, 2021 somewhat helpful  - Cyclobenzaprine    Review of Systems:  Negative for numbness/tingling, weakness, loss of bowel/bladder control, inability to urinate, headache, pain much worse in neck, trip/stumble/falls, difficulty swallowing, difficulty with hand skills, fevers,  unintentional weight loss.     Objective:   CONSTITUTIONAL:  Vital signs as above.  No acute distress.  The patient is well nourished and well groomed.    PSYCHIATRIC:  The patient is awake, alert, oriented to person, place and time.  The patient is answering questions appropriately with clear speech.  Normal affect.  HEENT: Normocephalic, atraumatic.  Sclera clear.    LYMPH: No cervical lymphadenopathy  SKIN:  Skin over the face, neck bilateral upper extremities is clean, dry, intact without rashes.  MUSCULOSKELETAL: The patient has 5/5 strength for the bilateral shoulder abductors, elbow flexors/extensors, wrist extensors, finger flexors/abductors.  Hypertonicity right greater than left upper trapezius muscle.  NEUROLOGICAL: 2+ biceps, brachioradialis, triceps reflexes bilaterally.  Negative Cunningham's bilaterally.  Sensation to light touch is intact bilateral upper extremities throughout.    RESULTS: I reviewed the MRI cervical spine from [facility] dated February 2, 2024.  There is multilevel loss of disc height.  At C2-3 there is mild facet disease.  At C3-4 there is facet disease with moderate left foraminal stenosis.  At C4-5 there is mild right and mild to moderate left foraminal stenosis.  At C5-6 there is a disc osteophyte complex with moderate bilateral foraminal stenosis and facet disease.         Again, thank you for allowing me to participate in the care of your patient.        Sincerely,        Amelia Elder PA-C

## 2024-02-06 NOTE — PROGRESS NOTES
Assessment:   Marvin Graves is a 33 year old y.o. male with past medical history significant for kidney donor who presents today for follow-up regarding acute on chronic neck pain, currently worse on the right.  Pain flared up about 1 to 2 weeks ago with no trauma..  My review of an MRI cervical spine shows multilevel disc degeneration and facet arthropathy.  There is moderate left foraminal stenosis C3-4.  At C4-5 there is mild right and mild to moderate left foraminal stenosis.  At C5-6 there is a disc osteophyte complex with moderate bilateral foraminal stenosis.  Suspect pain is multifactorial.  I believe he is primarily symptomatic from the cervical radiculitis related to the moderate foraminal stenosis at C5-6.  He is secondary myofascial pain.  He may also have a component of facet mediated pain.  He is neurologically intact on exam.       Plan:     A shared decision making plan was used.  The patient's values and choices were respected.  The following represents what was discussed and decided upon by the physician assistant and the patient.      1.  DIAGNOSTIC TESTS: I reviewed the MRI cervical spine.  No additional diagnostic test were ordered.    2.  PHYSICAL THERAPY: Patient went to physical therapy in 2021.  I offered a referral to [facility] physical therapy.  He is going to consider this and will let me know if he wants to proceed.    3.  MEDICATIONS:  - I refilled the patient cyclobenzaprine.  - I also prescribed gabapentin 300 mg titrating up to 3 had a milligrams 3 times daily.    4.  INTERVENTIONS: No interventions were ordered today.  - We did discuss a C7-T1 interlaminar epidural steroid injection.  He will consider this.  - If that did not help we can try a right C5-6 transforaminal epidural steroid injection.    5.  PATIENT EDUCATION: Patient is in agreement the above plan.  All questions were answered.    6.  FOLLOW-UP: Patient is going to consider his options and follow-up as needed.  He was sent  me a MyChart message if he wants to pursue physical therapy and/or an epidural steroid injection.  If he has any other questions or concerns, he should not hesitate to call.    Subjective:     Marvin Graves is a 33 year old male who presents today for follow-up regarding acute on chronic neck pain.  I have not seen this patient since June 28, 2021.  Patient reports that since I saw him he participated in physical therapy which was somewhat helpful.  About a year ago his pain increased.  He treated that with cyclobenzaprine which was helpful.  Then 1 to 2 weeks ago pain flared up again.  He states that he woke up with a stiff neck.  Again he took cyclobenzaprine which was somewhat helpful but he continues have significant pain.    Patient complains of right greater than left neck pain.  On the right the pain radiates into the shoulder blade area and out to the right shoulder.  He denies any pain further down the arm.  Denies any numbness, tingling, or weakness down the arms.  Denies headaches.  He rates his pain today as a 3 out of 10.  At its worst it is an 8 out of 10.  Pain is aggravated with sitting with poor posture at the computer.  He has difficulty sleeping because of the pain.  Pain is alleviated with standing, activity, and heat.    Treatment to date:  - Physical therapy in June through July 2021.  - Chiropractic treatment.  - Trigger point injection primary care clinic in June 9, 2021 somewhat helpful  - Cyclobenzaprine    Review of Systems:  Negative for numbness/tingling, weakness, loss of bowel/bladder control, inability to urinate, headache, pain much worse in neck, trip/stumble/falls, difficulty swallowing, difficulty with hand skills, fevers, unintentional weight loss.     Objective:   CONSTITUTIONAL:  Vital signs as above.  No acute distress.  The patient is well nourished and well groomed.    PSYCHIATRIC:  The patient is awake, alert, oriented to person, place and time.  The patient is answering  questions appropriately with clear speech.  Normal affect.  HEENT: Normocephalic, atraumatic.  Sclera clear.    LYMPH: No cervical lymphadenopathy  SKIN:  Skin over the face, neck bilateral upper extremities is clean, dry, intact without rashes.  MUSCULOSKELETAL: The patient has 5/5 strength for the bilateral shoulder abductors, elbow flexors/extensors, wrist extensors, finger flexors/abductors.  Hypertonicity right greater than left upper trapezius muscle.  NEUROLOGICAL: 2+ biceps, brachioradialis, triceps reflexes bilaterally.  Negative Cunningham's bilaterally.  Sensation to light touch is intact bilateral upper extremities throughout.    RESULTS: I reviewed the MRI cervical spine from [facility] dated February 2, 2024.  There is multilevel loss of disc height.  At C2-3 there is mild facet disease.  At C3-4 there is facet disease with moderate left foraminal stenosis.  At C4-5 there is mild right and mild to moderate left foraminal stenosis.  At C5-6 there is a disc osteophyte complex with moderate bilateral foraminal stenosis and facet disease.

## 2024-02-06 NOTE — PATIENT INSTRUCTIONS
Prescribed Gabapentin today, 300 mg tablets, to be titrated up to 1 tablets 3 times a day as tolerated for your nerve pain. Please follow Gabapentin dosing chart below.    Gabapentin 300mg Dosing Chart    DATE  MORNING AFTERNOON BEDTIME    Day 1 0 0 1    Day 2 0 0 1    Day 3 0 0 1    Day 4 1 0 1    Day 5 1 0 1    Day 6 1 0 1    Day 7 1 1 1    Day 8 1 1 1    Day 9 1 1 1                                                                                                                                   Continue medication, taking 1 capsules three times daily  Please call if you have any questions regarding how to take your medication       Please let me know if you want a referral for medx PT.    If you want to try an injection it would be a C7/T1 epidural steroid injection

## 2024-03-12 ENCOUNTER — THERAPY VISIT (OUTPATIENT)
Dept: PHYSICAL THERAPY | Facility: CLINIC | Age: 34
End: 2024-03-12
Attending: PHYSICIAN ASSISTANT
Payer: COMMERCIAL

## 2024-03-12 DIAGNOSIS — M79.18 MYOFASCIAL PAIN: ICD-10-CM

## 2024-03-12 DIAGNOSIS — M54.12 CERVICAL RADICULAR PAIN: ICD-10-CM

## 2024-03-12 PROCEDURE — 97161 PT EVAL LOW COMPLEX 20 MIN: CPT | Mod: GP | Performed by: PHYSICAL THERAPIST

## 2024-03-12 PROCEDURE — 97110 THERAPEUTIC EXERCISES: CPT | Mod: GP | Performed by: PHYSICAL THERAPIST

## 2024-03-12 NOTE — PROGRESS NOTES
PHYSICAL THERAPY EVALUATION  Type of Visit: Evaluation    See electronic medical record for Abuse and Falls Screening details.    Subjective   Marvin is presenting for evaluation of R sided neck pain. Originally started in March 2020. Back then it was on his L side. Improved with 2 sessions of PT. He noted his R side to start about 4 months, insidiously. Woke up from a nap with tingling on the R side. It has improved since December. Physically he has been getting the house ready for a baby. Started a new job at [employer]. Pain with turning the head to the R or in the clean room with his arm abducted.    Sitting pain: 3/10, a little tingling, improved when fixes posture  Worst pain: 5/10 in the clean room  No HA's no difficulty chewing or swallowing. Negative red flags.  Sleeping better with the gabapentin     His wife is expecting a baby in April.       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 L  ROM:   (Degrees) Left AROM Right AROM    Cervical Flexion 63    Cervical Extension 48    Cervical Side bend 40 30    Cervical Rotation 65 70    Shoulder flexion B 165      MYOTOMES: WNL    CORD SIGNS: Cunningham negative L, Cunningham negative R  DERMATOMES: WNL  NEURAL TENSION:  positive R median    SPECIAL  TESTS:  mild positive spurling on the R, DNF endurance 60 seconds  PALPATION:  R UT spasm      Assessment & Plan   CLINICAL IMPRESSIONS  Medical Diagnosis: cervical radicular pain    Treatment Diagnosis: Cervical radicular pain and impaired posture   Impression/Assessment:  Marvin is presenting for new onset but improving R radicular pain since 12/2024. PT exam reveals positive spurling on the R, positive median neural tension and reduced cervical extension. PT did proceed with medx testing and he is demonstrating a deficit in cervical end range extension. Patient to benefit from the medx program and is a good candidate.    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   Patient/Family/caregiver agrees with Plan of Care.     Evaluation Time:     PT Eval, Low Complexity Minutes (12906): 25       Signing Clinician: Katiuska Sánchez, PT  All weight progressions in therapy sessions are dictated by the patient  tolerance and therapist discretion. Testing on MedX equipment is completed on 4-week intervals to allow for physiological change in muscle structure and neuromuscular re-education.      Cervical MedX Initial testing  3/12/24   AROM (full= 0-120  cervical)    Max Extension Torque  434#   Flex: ext ratio (ideal 1.4:1) 1.46:1     Date 3/12/24   Cervical Parameters    Top Dead Center (TDC) 42   Counterbalance (CB) 1.9   Seat Height 270   Week/Visit    Enter Week/Visit # Wk 1 v 1   Weight (lbs)    Reps (#) --   Time --   ROM (degrees)    Pain    Therapist cues      Date 3/12/24   Exercise    Supine cervical retraction X 10 hold 5 seconds   Seated cervical sidebending  X 20 seconds sit on r hand   Median nerve glides with head in neutral X 10 B                                         Modifications instructed by therapist:

## 2024-03-18 ENCOUNTER — THERAPY VISIT (OUTPATIENT)
Dept: PHYSICAL THERAPY | Facility: CLINIC | Age: 34
End: 2024-03-18
Attending: PHYSICIAN ASSISTANT
Payer: COMMERCIAL

## 2024-03-18 DIAGNOSIS — M54.12 CERVICAL RADICULAR PAIN: Primary | ICD-10-CM

## 2024-03-18 DIAGNOSIS — M79.18 MYOFASCIAL PAIN: ICD-10-CM

## 2024-03-18 PROCEDURE — 97110 THERAPEUTIC EXERCISES: CPT | Mod: GP | Performed by: PHYSICAL THERAPIST

## 2024-03-18 NOTE — PROGRESS NOTES
All weight progressions in therapy sessions are dictated by the patient tolerance and therapist discretion. Testing on MedX equipment is completed on 4-week intervals to allow for physiological change in muscle structure and neuromuscular re-education.      Cervical MedX Initial testing  3/12/24   AROM (full= 0-120  cervical)    Max Extension Torque  434#   Flex: ext ratio (ideal 1.4:1) 1.46:1     Date 3/18/2024 3/12/24   Cervical Parameters     Top Dead Center (TDC) 42 42   Counterbalance (CB) 1.9 1.9   Seat Height 270 270   Week/Visit     Enter Week/Visit # Wk 1 V 2 Wk 1 v 1   Weight (lbs) 280#    Reps (#) 20 --   Time 153 --   ROM (degrees)     Pain     Therapist cues       Date 3/18/2024 3/12/24   Exercise     Treadmill X 4 min     Rotary torso 90 seconds 40#'s started R    Supine cervical retraction Review, cues on form, dec ROM if causing UE pain X 10 hold 5 seconds   Seated cervical sidebending  Review, try supine or no hold if UE pain X 20 seconds sit on r hand   Median nerve glides with head in neutral X 10, good form noted X 10 B   Cervical retraction with ext UE pain                                              Modifications instructed by therapist:

## 2024-04-02 ENCOUNTER — THERAPY VISIT (OUTPATIENT)
Dept: PHYSICAL THERAPY | Facility: CLINIC | Age: 34
End: 2024-04-02
Payer: COMMERCIAL

## 2024-04-02 DIAGNOSIS — M79.18 MYOFASCIAL PAIN: ICD-10-CM

## 2024-04-02 DIAGNOSIS — M54.12 CERVICAL RADICULAR PAIN: Primary | ICD-10-CM

## 2024-04-02 PROCEDURE — 97110 THERAPEUTIC EXERCISES: CPT | Mod: GP | Performed by: PHYSICAL THERAPIST

## 2024-04-02 NOTE — PROGRESS NOTES
All weight progressions in therapy sessions are dictated by the patient tolerance and therapist discretion. Testing on MedX equipment is completed on 4-week intervals to allow for physiological change in muscle structure and neuromuscular re-education.      Cervical MedX Initial testing  3/12/24   AROM (full= 0-120  cervical)    Max Extension Torque  434#   Flex: ext ratio (ideal 1.4:1) 1.46:1     Date 4/2/24 3/18/2024 3/12/24   Cervical Parameters      Top Dead Center (TDC) 42 42 42   Counterbalance (CB) 1.9 1.9 1.9   Seat Height 270 270 270   Week/Visit      Enter Week/Visit # Wk 2 v1  Wk 1 V 2 Wk 1 v 1   Weight (lbs) 298# 280#    Reps (#) 21 20 --   Time 160 153 --   ROM (degrees)      Pain      Therapist cues        Date 4/2/24 3/18/2024 3/12/24   Exercise      Treadmill X 3 min X 4 min     Rotary torso 90 seconds 46# to L 40#'s started R    Supine cervical retraction  Review, cues on form, dec ROM if causing UE pain X 10 hold 5 seconds   Seated cervical sidebending   Review, try supine or no hold if UE pain X 20 seconds sit on r hand   Median nerve glides with head in neutral  X 10, good form noted X 10 B   Cervical retraction with ext  UE pain    Cervical extension isometric X 5 hold 5 seconds     Low row with blue band X 10                                           Modifications instructed by therapist:

## 2024-06-20 NOTE — PROGRESS NOTES
DISCHARGE  Reason for Discharge: Patient chooses to discontinue therapy.    Equipment Issued: None    Discharge Plan: Patient to continue home program.    Referring Provider:  Amelia Elder     04/02/24 0500   Appointment Info   Signing clinician's name / credentials Katiuska Sánchez, PT   Total/Authorized Visits 16(medx)   Visits Used 3   Medical Diagnosis cervical radicular pain   PT Tx Diagnosis Cervical radicular pain and impaired posture   Progress Note/Certification   Onset of illness/injury or Date of Surgery 12/01/23   Therapy Frequency 2x/week   Predicted Duration 12 weeks   PT Goal 1   Goal Identifier 1   Goal Description Patient will be independent with home exercise program and self-care   Target Date 06/10/24   PT Goal 2   Goal Identifier 2   Goal Description 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 His wive is due next week with their baby.   Therapeutic Procedure/Exercise   Therapeutic Procedures: strength, endurance, ROM, flexibility minutes (87590) 20   Ther Proc 1 initial MedX testing   Skilled Intervention added some neck and scapular strengthening into HEP   Patient Response/Progress tolerated well   Education   Learner/Method Patient   Education Comments 10 minutes late for treatment   Plan   Home program PTRX   Plan for next session MedX   Comments   Comments Marvin is following up for his 3rd PT appointment in management of cervical radiculopathy. He is noting to be feeling better and especially thinks the nerve glides are helping. PT added cervical extension isometrics and band strengthening for postural stability. The patient is appropriate to continue with skilled PT per POC.    Total Session Time   Timed Code Treatment Minutes 20   Total Treatment Time (sum of timed and untimed services) 20

## 2024-12-07 ENCOUNTER — HEALTH MAINTENANCE LETTER (OUTPATIENT)
Age: 34
End: 2024-12-07

## 2025-01-07 ENCOUNTER — APPOINTMENT (OUTPATIENT)
Dept: ULTRASOUND IMAGING | Facility: HOSPITAL | Age: 35
End: 2025-01-07
Attending: EMERGENCY MEDICINE
Payer: COMMERCIAL

## 2025-01-07 ENCOUNTER — HOSPITAL ENCOUNTER (EMERGENCY)
Facility: HOSPITAL | Age: 35
Discharge: HOME | End: 2025-01-07
Attending: EMERGENCY MEDICINE
Payer: COMMERCIAL

## 2025-01-07 VITALS
HEART RATE: 93 BPM | WEIGHT: 185 LBS | BODY MASS INDEX: 25.44 KG/M2 | SYSTOLIC BLOOD PRESSURE: 130 MMHG | RESPIRATION RATE: 19 BRPM | DIASTOLIC BLOOD PRESSURE: 78 MMHG | TEMPERATURE: 98.9 F | OXYGEN SATURATION: 98 %

## 2025-01-07 DIAGNOSIS — N45.1 EPIDIDYMITIS: ICD-10-CM

## 2025-01-07 LAB
ALBUMIN UR-MCNC: NEGATIVE MG/DL
APPEARANCE UR: CLEAR
BACTERIA #/AREA URNS HPF: ABNORMAL /HPF
BILIRUB UR QL STRIP: NEGATIVE
C TRACH DNA SPEC QL PROBE+SIG AMP: NEGATIVE
COLOR UR AUTO: ABNORMAL
GLUCOSE UR STRIP-MCNC: NEGATIVE MG/DL
HGB UR QL STRIP: NEGATIVE
KETONES UR STRIP-MCNC: NEGATIVE MG/DL
LEUKOCYTE ESTERASE UR QL STRIP: ABNORMAL
N GONORRHOEA DNA SPEC QL NAA+PROBE: NEGATIVE
NITRATE UR QL: NEGATIVE
PH UR STRIP: 6.5 [PH] (ref 5–7)
RBC URINE: 3 /HPF
SP GR UR STRIP: 1.02 (ref 1–1.03)
UROBILINOGEN UR STRIP-MCNC: <2 MG/DL
WBC URINE: 22 /HPF

## 2025-01-07 PROCEDURE — 250N000009 HC RX 250: Performed by: EMERGENCY MEDICINE

## 2025-01-07 PROCEDURE — 96372 THER/PROPH/DIAG INJ SC/IM: CPT | Performed by: EMERGENCY MEDICINE

## 2025-01-07 PROCEDURE — 76870 US EXAM SCROTUM: CPT

## 2025-01-07 PROCEDURE — 250N000011 HC RX IP 250 OP 636: Performed by: EMERGENCY MEDICINE

## 2025-01-07 PROCEDURE — 250N000013 HC RX MED GY IP 250 OP 250 PS 637: Performed by: EMERGENCY MEDICINE

## 2025-01-07 PROCEDURE — 99285 EMERGENCY DEPT VISIT HI MDM: CPT | Mod: 25

## 2025-01-07 PROCEDURE — 81003 URINALYSIS AUTO W/O SCOPE: CPT | Performed by: STUDENT IN AN ORGANIZED HEALTH CARE EDUCATION/TRAINING PROGRAM

## 2025-01-07 PROCEDURE — 87591 N.GONORRHOEAE DNA AMP PROB: CPT | Performed by: EMERGENCY MEDICINE

## 2025-01-07 PROCEDURE — 93976 VASCULAR STUDY: CPT

## 2025-01-07 PROCEDURE — 87491 CHLMYD TRACH DNA AMP PROBE: CPT | Performed by: EMERGENCY MEDICINE

## 2025-01-07 PROCEDURE — 87086 URINE CULTURE/COLONY COUNT: CPT | Performed by: STUDENT IN AN ORGANIZED HEALTH CARE EDUCATION/TRAINING PROGRAM

## 2025-01-07 RX ORDER — DOXYCYCLINE 100 MG/1
100 CAPSULE ORAL ONCE
Status: COMPLETED | OUTPATIENT
Start: 2025-01-07 | End: 2025-01-07

## 2025-01-07 RX ORDER — ACETAMINOPHEN 325 MG/1
975 TABLET ORAL ONCE
Status: COMPLETED | OUTPATIENT
Start: 2025-01-07 | End: 2025-01-07

## 2025-01-07 RX ORDER — DOXYCYCLINE 100 MG/1
100 CAPSULE ORAL 2 TIMES DAILY
Qty: 14 CAPSULE | Refills: 0 | Status: SHIPPED | OUTPATIENT
Start: 2025-01-07 | End: 2025-01-14

## 2025-01-07 RX ADMIN — DOXYCYCLINE 100 MG: 100 CAPSULE ORAL at 08:25

## 2025-01-07 RX ADMIN — ACETAMINOPHEN 975 MG: 325 TABLET ORAL at 07:10

## 2025-01-07 RX ADMIN — LIDOCAINE HYDROCHLORIDE 500 MG: 10 INJECTION, SOLUTION INFILTRATION; PERINEURAL at 08:52

## 2025-01-07 ASSESSMENT — ACTIVITIES OF DAILY LIVING (ADL)
ADLS_ACUITY_SCORE: 41
ADLS_ACUITY_SCORE: 41

## 2025-01-07 ASSESSMENT — COLUMBIA-SUICIDE SEVERITY RATING SCALE - C-SSRS
2. HAVE YOU ACTUALLY HAD ANY THOUGHTS OF KILLING YOURSELF IN THE PAST MONTH?: NO
1. IN THE PAST MONTH, HAVE YOU WISHED YOU WERE DEAD OR WISHED YOU COULD GO TO SLEEP AND NOT WAKE UP?: NO
6. HAVE YOU EVER DONE ANYTHING, STARTED TO DO ANYTHING, OR PREPARED TO DO ANYTHING TO END YOUR LIFE?: NO

## 2025-01-07 NOTE — DISCHARGE INSTRUCTIONS
You have evidence of epididymitis on ultrasound and urine does also look infected.  You were given the first dose of antibiotics here.  Take antibiotics as prescribed.  You may take Tylenol 1000 mg 4 times daily as needed for pain.  Follow-up with primary care, referral provided.

## 2025-01-07 NOTE — ED TRIAGE NOTES
The patient reports testicular pain that started   On 1/2. He describes the pain as a UTI pain.  He reports blood in semen on 1/5. Pain not effected by urination. The pain is now worse today. Denies recent trauma.

## 2025-01-07 NOTE — ED PROVIDER NOTES
"EMERGENCY DEPARTMENT ENCOUNTER      NAME: Marvin Graves  AGE: 34 year old male  YOB: 1990  MRN: [id number]  EVALUATION DATE & TIME: No admission date for patient encounter.    PCP: No Ref-Primary, Physician    ED PROVIDER: Jacqueline Cordova MD    Chief Complaint   Patient presents with    Groin Pain         FINAL IMPRESSION:  1. Epididymitis          ED COURSE & MEDICAL DECISION MAKING:    Pertinent Labs & Imaging studies reviewed. (See chart for details)  34 year old male with history of kidney donor who presents to the Emergency Department for evaluation of 5 days of pain in his left testis feeling like \"I have a UTI my testicle\", 1 episode of blood within the semen, worsening pain/swelling today.  On examination patient has left-sided hemiscrotal swelling, tenderness to the epididymis.  Based on his description of symptoms highly suspicious for epididymitis.  Differential includes orchitis, UTI, STI.  Get given Tylenol for pain.  Patient initially seen evaluate by myself in triage area due to boarding crisis.  Urinalysis appears infected.  Testicular ultrasound shows evidence of epididymitis.  Cover with Rocephin, doxycycline.  Urine sent for gonorrhea and chlamydia.  Discharged home with PCP referral.        ED Course as of 01/07/25 0922   Tue Jan 07, 2025   0751 UA with Microscopic reflex to Culture(!)  Urine appears infected   0818 US Testicular & Scrotum w Doppler Ltd  Testicular ultrasound independently interpreted by myself with prominent epididymis on the left       Medical Decision Making  Obtained supplemental history:Supplemental history obtained?: Documented in chart  Reviewed external records: External records reviewed?: Documented in chart and Outpatient Record: Clinic visit 10/9/2023  Care impacted by chronic illness:Other: Acquired solitary kidney  Did you consider but not order tests?: Work up considered but not performed and documented in chart, if applicable  Did you interpret " "images independently?: Independent interpretation of ECG and images noted in documentation, when applicable.  Consultation discussion with other provider:Did you involve another provider (consultant, , pharmacy, etc.)?: No  Discharge. I prescribed additional prescription strength medication(s) as charted. See documentation for any additional details.    MIPS: Not Applicable      At the conclusion of the encounter I discussed the results of all of the tests and the disposition. The questions were answered. The patient or family acknowledged understanding and was agreeable with the care plan.    MEDICATIONS GIVEN IN THE EMERGENCY:  Medications   acetaminophen (TYLENOL) tablet 975 mg (975 mg Oral $Given 1/7/25 0710)   cefTRIAXone (ROCEPHIN) 500 mg in lidocaine injection (500 mg Intramuscular $Given 1/7/25 8315)   doxycycline monohydrate (MONODOX) capsule 100 mg (100 mg Oral $Given 1/7/25 6767)       NEW PRESCRIPTIONS STARTED AT TODAY'S ER VISIT  New Prescriptions    DOXYCYCLINE HYCLATE (VIBRAMYCIN) 100 MG CAPSULE    Take 1 capsule (100 mg) by mouth 2 times daily for 7 days.          =================================================================    HPI    Patient information was obtained from: Patient     Use of Intrepreter: N/A        Marvin Graves is a 34 year old male with pertinent medical history of nephrectomy who presents for evaluation of testicular pain.     The patient reports that for the last 5 days, he has been experiencing a burning and stinging sensation in his left testis with urination. He describes this as feeling like he has a \"UTI in his testicle.\" Patient also notes that a few days ago, he had an episode of blood in his semen after he ejaculated. This morning, he has worsened pain with new swelling.     Denies primary dysuria, penile discharge, abdominal pain, or any other concerns at this time. Patient has a remote history of chlamydia before he was  but denies any new sexual partners " or concerns for STIs.     Per chart review, patient was seen by [facility] medicine for his annual physical on 10/9/2023. History of nephrectomy as patient donated one kidney to his sister. Creatinine was mildly elevated but labs were otherwise unremarkable.      PAST MEDICAL HISTORY:  No past medical history on file.    PAST SURGICAL HISTORY:  Past Surgical History:   Procedure Laterality Date    FINGER CLOSED REDUCTION W/ PERCUTANEOUS PINNING      NEPHRECTOMY      kidney donation       CURRENT MEDICATIONS:    Prior to Admission Medications   Prescriptions Last Dose Informant Patient Reported? Taking?   cyclobenzaprine (FLEXERIL) 5 MG tablet   No No   Sig: Take 1 tablet (5 mg) by mouth 3 times daily as needed for muscle spasms   gabapentin (NEURONTIN) 300 MG capsule   No No   Sig: Take 1 capsule (300 mg) by mouth at bedtime for 3 days, THEN 1 capsule (300 mg) 2 times daily for 3 days, THEN 1 capsule (300 mg) 3 times daily for 84 days.      Facility-Administered Medications: None       ALLERGIES:  No Known Allergies    FAMILY HISTORY:  Family History   Problem Relation Age of Onset    No Known Problems Mother     Cancer Father         CLL    No Known Problems Sister     Diabetes Paternal Grandmother     No Known Problems Paternal Grandfather     Kidney failure Half-Sister        SOCIAL HISTORY:  Social History     Tobacco Use    Smoking status: Never    Smokeless tobacco: Never   Vaping Use    Vaping status: Never Used   Substance Use Topics    Alcohol use: Not Currently     Alcohol/week: 3.0 standard drinks of alcohol     Types: 3 Standard drinks or equivalent per week    Drug use: Yes     Types: Marijuana     Comment: Drug use: every other day.  goes through an eigth every other day        VITALS:  Patient Vitals for the past 24 hrs:   BP Temp Temp src Pulse Resp SpO2 Weight   01/07/25 0857 -- -- -- 93 -- -- --   01/07/25 0827 130/78 -- -- 103 19 98 % --   01/07/25 0650 (!) 141/90 98.9  F (37.2  C) Oral 118 16 99 %  83.9 kg (185 lb)       PHYSICAL EXAM    General Appearance: Well-appearing, well-nourished, no acute distress   Head:  Normocephalic  Cardio: Tachycardic 110s, normalized on recheck  Pulm:  No respiratory distress  Abdomen:  Soft, non-tender, non distended,no rebound or guarding.  : Mild swelling to left hemiscrotum, no erythema, tenderness to testis, small amount of urethral discharge  Extremities: Normal gait  Skin:  Skin warm, dry, no rashes  Neuro:  Alert and oriented ×3     RADIOLOGY/LABS:  Reviewed all pertinent imaging. Please see official radiology report. All pertinent labs reviewed and interpreted.    Results for orders placed or performed during the hospital encounter of 01/07/25   US Testicular & Scrotum w Doppler Ltd    Impression    IMPRESSION:  1.  Left epididymitis.  2.  No orchitis.  3.  No torsion.   UA with Microscopic reflex to Culture    Specimen: Urine, Clean Catch   Result Value Ref Range    Color Urine Light Yellow Colorless, Straw, Light Yellow, Yellow    Appearance Urine Clear Clear    Glucose Urine Negative Negative mg/dL    Bilirubin Urine Negative Negative    Ketones Urine Negative Negative mg/dL    Specific Gravity Urine 1.017 1.001 - 1.030    Blood Urine Negative Negative    pH Urine 6.5 5.0 - 7.0    Protein Albumin Urine Negative Negative mg/dL    Urobilinogen Urine <2.0 <2.0 mg/dL    Nitrite Urine Negative Negative    Leukocyte Esterase Urine 250 Kimberly/uL (A) Negative    Bacteria Urine Few (A) None Seen /HPF    RBC Urine 3 (H) <=2 /HPF    WBC Urine 22 (H) <=5 /HPF       The creation of this record is based on the scribe s observations of the work being performed by Jacqueline Cordova MD and the provider s statements to them. It was created on her behalf by Tiffany Blackman, a trained medical scribe. This document has been checked and approved by the attending provider.    Jacqueline Cordova MD  Emergency Medicine  [facility] EMERGENCY  DEPARTMENT  [address] [place] [id number]  [phone]  Dept: [phone]      Jacqueline Cordova MD  01/07/25 0922

## 2025-01-08 LAB — BACTERIA UR CULT: NO GROWTH

## 2025-02-02 SDOH — HEALTH STABILITY: PHYSICAL HEALTH: ON AVERAGE, HOW MANY DAYS PER WEEK DO YOU ENGAGE IN MODERATE TO STRENUOUS EXERCISE (LIKE A BRISK WALK)?: 1 DAY

## 2025-02-02 SDOH — HEALTH STABILITY: PHYSICAL HEALTH: ON AVERAGE, HOW MANY MINUTES DO YOU ENGAGE IN EXERCISE AT THIS LEVEL?: 10 MIN

## 2025-02-02 ASSESSMENT — SOCIAL DETERMINANTS OF HEALTH (SDOH): HOW OFTEN DO YOU GET TOGETHER WITH FRIENDS OR RELATIVES?: ONCE A WEEK

## 2025-02-03 ENCOUNTER — OFFICE VISIT (OUTPATIENT)
Dept: FAMILY MEDICINE | Facility: CLINIC | Age: 35
End: 2025-02-03
Payer: COMMERCIAL

## 2025-02-03 VITALS
HEIGHT: 72 IN | BODY MASS INDEX: 24.79 KG/M2 | HEART RATE: 64 BPM | RESPIRATION RATE: 20 BRPM | DIASTOLIC BLOOD PRESSURE: 72 MMHG | TEMPERATURE: 98.5 F | WEIGHT: 183 LBS | OXYGEN SATURATION: 97 % | SYSTOLIC BLOOD PRESSURE: 108 MMHG

## 2025-02-03 DIAGNOSIS — Z00.00 ROUTINE GENERAL MEDICAL EXAMINATION AT A HEALTH CARE FACILITY: Primary | ICD-10-CM

## 2025-02-03 DIAGNOSIS — Z13.220 SCREENING FOR HYPERLIPIDEMIA: ICD-10-CM

## 2025-02-03 DIAGNOSIS — Z52.4 KIDNEY DONOR: ICD-10-CM

## 2025-02-03 DIAGNOSIS — Z13.1 SCREENING FOR DIABETES MELLITUS: ICD-10-CM

## 2025-02-03 DIAGNOSIS — N45.1 EPIDIDYMITIS: ICD-10-CM

## 2025-02-03 LAB
ALBUMIN UR-MCNC: NEGATIVE MG/DL
ANION GAP SERPL CALCULATED.3IONS-SCNC: 12 MMOL/L (ref 7–15)
APPEARANCE UR: CLEAR
BILIRUB UR QL STRIP: NEGATIVE
BUN SERPL-MCNC: 16.8 MG/DL (ref 6–20)
CALCIUM SERPL-MCNC: 10 MG/DL (ref 8.8–10.4)
CHLORIDE SERPL-SCNC: 103 MMOL/L (ref 98–107)
CHOLEST SERPL-MCNC: 159 MG/DL
COLOR UR AUTO: YELLOW
CREAT SERPL-MCNC: 1.17 MG/DL (ref 0.67–1.17)
EGFRCR SERPLBLD CKD-EPI 2021: 84 ML/MIN/1.73M2
FASTING STATUS PATIENT QL REPORTED: NORMAL
FASTING STATUS PATIENT QL REPORTED: NORMAL
GLUCOSE SERPL-MCNC: 97 MG/DL (ref 70–99)
GLUCOSE UR STRIP-MCNC: NEGATIVE MG/DL
HCO3 SERPL-SCNC: 25 MMOL/L (ref 22–29)
HDLC SERPL-MCNC: 40 MG/DL
HGB UR QL STRIP: NEGATIVE
KETONES UR STRIP-MCNC: NEGATIVE MG/DL
LDLC SERPL CALC-MCNC: 97 MG/DL
LEUKOCYTE ESTERASE UR QL STRIP: NEGATIVE
NITRATE UR QL: NEGATIVE
NONHDLC SERPL-MCNC: 119 MG/DL
PH UR STRIP: 6 [PH] (ref 5–8)
POTASSIUM SERPL-SCNC: 4.5 MMOL/L (ref 3.4–5.3)
SODIUM SERPL-SCNC: 140 MMOL/L (ref 135–145)
SP GR UR STRIP: 1.01 (ref 1–1.03)
TRIGL SERPL-MCNC: 111 MG/DL
UROBILINOGEN UR STRIP-ACNC: 0.2 E.U./DL

## 2025-02-03 PROCEDURE — 90656 IIV3 VACC NO PRSV 0.5 ML IM: CPT

## 2025-02-03 PROCEDURE — 90471 IMMUNIZATION ADMIN: CPT

## 2025-02-03 PROCEDURE — 36415 COLL VENOUS BLD VENIPUNCTURE: CPT

## 2025-02-03 PROCEDURE — 99395 PREV VISIT EST AGE 18-39: CPT | Mod: 25

## 2025-02-03 PROCEDURE — 80061 LIPID PANEL: CPT

## 2025-02-03 PROCEDURE — 80048 BASIC METABOLIC PNL TOTAL CA: CPT

## 2025-02-03 PROCEDURE — 81003 URINALYSIS AUTO W/O SCOPE: CPT

## 2025-02-03 ASSESSMENT — PAIN SCALES - GENERAL: PAINLEVEL_OUTOF10: MILD PAIN (2)

## 2025-02-03 NOTE — PROGRESS NOTES
Preventive Care Visit  [facility]  RYAN Marcano CNP, Family Medicine  Feb 3, 2025      Assessment & Plan     Routine general medical examination at a health care facility  Reviewed age appropriate screening and immunizations. Screen for cholesterol and diabetes today. Vaccines given: Flu shot, declined COVID-vaccine today.  Encouraged healthy lifestyle choices to reduce risk of cardiovascular disease. Return in 1 year for annual exam.      Screening for diabetes mellitus      Screening for hyperlipidemia  - Lipid panel reflex to direct LDL Fasting; Future  - Lipid panel reflex to direct LDL Fasting    Kidney donor  Yearly kidney labs.  - Basic metabolic panel  (Ca, Cl, CO2, Creat, Gluc, K, Na, BUN); Future  - Basic metabolic panel  (Ca, Cl, CO2, Creat, Gluc, K, Na, BUN)    Epididymitis  UA clear today.  - UA Macroscopic with reflex to Microscopic and Culture - Lab Collect; Future  - UA Macroscopic with reflex to Microscopic and Culture - Lab Collect          Counseling  Appropriate preventive services were addressed with this patient via screening, questionnaire, or discussion as appropriate for fall prevention, nutrition, physical activity, Tobacco-use cessation, social engagement, weight loss and cognition.  Checklist reviewing preventive services available has been given to the patient.  Reviewed patient's diet, addressing concerns and/or questions.   He is at risk for lack of exercise and has been provided with information to increase physical activity for the benefit of his well-being.   He is at risk for psychosocial distress and has been provided with information to reduce risk.       Follow-up in 1 year for annual exam    Carmelo Wong is a 34 year old, presenting for the following:  Physical (Questioning )        2/3/2025    12:20 PM   Additional Questions   Roomed by VICTOR MANUEL Caro   Accompanied by Self          HPI    Annual physical today.    Has 9-month-old at  home    Recent ER visit for epididymitis was treated with IM ceftriaxone and doxycycline.  Urine culture did not have any growth.  He reports symptoms overall have gotten better, he reports a little mild tenderness in one of his testicles, swelling has gone down, no blood in semen or urine.  No symptoms with urination as well.    History of kidney donor, he was previously followed by [place].  He does not need to follow with them.  Needs yearly labs    Chronic neck pain  Struggled with neck pain and radiculopathy in the past, has seen orthopedics and physical therapy.  No concerns on MRI.  He has no radiculopathy currently.  Just reports chronic neck tightness and tension.  He reports physical therapy did help in the past, just difficult to sustain.  He does feel like he overall does have stress in his life and has difficulty sleeping.  He has not been able to be as active and exercise as much due to new baby at home as well as new job last year.      Health Care Directive  Patient does not have a Health Care Directive: Discussed advance care planning with patient; however, patient declined at this time.      2/2/2025   General Health   How would you rate your overall physical health? (!) FAIR   Feel stress (tense, anxious, or unable to sleep) To some extent   (!) STRESS CONCERN      2/2/2025   Nutrition   Three or more servings of calcium each day? (!) I DON'T KNOW   Diet: Regular (no restrictions)   How many servings of fruit and vegetables per day? (!) 0-1   How many sweetened beverages each day? 0-1         2/2/2025   Exercise   Days per week of moderate/strenous exercise 1 day   Average minutes spent exercising at this level 10 min   (!) EXERCISE CONCERN      2/2/2025   Social Factors   Frequency of gathering with friends or relatives Once a week   Worry food won't last until get money to buy more No   Food not last or not have enough money for food? No   Do you have housing? (Housing is defined as stable  "permanent housing and does not include staying ouside in a car, in a tent, in an abandoned building, in an overnight shelter, or couch-surfing.) Yes   Are you worried about losing your housing? No   Lack of transportation? No   Unable to get utilities (heat,electricity)? No         2/2/2025   Dental   Dentist two times every year? Yes         2/2/2025   TB Screening   Were you born outside of the [place]? No         Today's PHQ-2 Score:       2/2/2025     4:21 PM   PHQ-2 ( 1999 [employer])   Q1: Little interest or pleasure in doing things 1   Q2: Feeling down, depressed or hopeless 0   PHQ-2 Score 1    Q1: Little interest or pleasure in doing things Several days   Q2: Feeling down, depressed or hopeless Not at all   PHQ-2 Score 1       Patient-reported           2/2/2025   Substance Use   Alcohol more than 3/day or more than 7/wk No   Do you use any other substances recreationally? (!) DECLINE     Social History     Tobacco Use    Smoking status: Never    Smokeless tobacco: Never   Vaping Use    Vaping status: Never Used   Substance Use Topics    Alcohol use: Not Currently     Alcohol/week: 3.0 standard drinks of alcohol     Types: 3 Standard drinks or equivalent per week    Drug use: Yes     Types: Marijuana     Comment: Drug use: every other day.  goes through an eigth every other day           2/2/2025   STI Screening   New sexual partner(s) since last STI/HIV test? No         2/2/2025   Contraception/Family Planning   Questions about contraception or family planning No        Reviewed and updated as needed this visit by Provider                    History reviewed. No pertinent past medical history.      Review of Systems  Constitutional, neuro, ENT, endocrine, pulmonary, cardiac, gastrointestinal, genitourinary, musculoskeletal, integument and psychiatric systems are negative, except as otherwise noted.     Objective    Exam  /72   Pulse 64   Temp 98.5  F (36.9  C) (Oral)   Resp 20   Ht 1.822 m (5' 11.75\")   " "Wt 83 kg (183 lb)   SpO2 97%   BMI 24.99 kg/m     Estimated body mass index is 24.99 kg/m  as calculated from the following:    Height as of this encounter: 1.822 m (5' 11.75\").    Weight as of this encounter: 83 kg (183 lb).    Physical Exam  GENERAL: alert and no distress  EYES: Eyes grossly normal to inspection, PERRL and conjunctivae and sclerae normal  HENT: ear canals and TM's normal, and mouth without ulcers or lesions  NECK: no adenopathy, no asymmetry, masses, or scars  RESP: lungs clear to auscultation - no rales, rhonchi or wheezes  CV: regular rate and rhythm, normal S1 S2, no S3 or S4, no murmur, click or rub, no peripheral edema  ABDOMEN: soft, nontender, no hepatosplenomegaly, no masses and bowel sounds normal  MS: no gross musculoskeletal defects noted, no edema  SKIN: no suspicious lesions or rashes  NEURO: Normal strength and tone, mentation intact and speech normal  PSYCH: mentation appears normal, affect normal/bright        Signed Electronically by: Justine Sandhu, RYAN CNP    "

## 2025-02-03 NOTE — PATIENT INSTRUCTIONS
Patient Education   Preventive Care Advice   This is general advice given by our system to help you stay healthy. However, your care team may have specific advice just for you. Please talk to your care team about your preventive care needs.  Nutrition  Eat 5 or more servings of fruits and vegetables each day.  Try wheat bread, brown rice and whole grain pasta (instead of white bread, rice, and pasta).  Get enough calcium and vitamin D. Check the label on foods and aim for 100% of the RDA (recommended daily allowance).  Lifestyle  Exercise at least 150 minutes each week  (30 minutes a day, 5 days a week).  Do muscle strengthening activities 2 days a week. These help control your weight and prevent disease.  No smoking.  Wear sunscreen to prevent skin cancer.  Have a dental exam and cleaning every 6 months.  Yearly exams  See your health care team every year to talk about:  Any changes in your health.  Any medicines your care team has prescribed.  Preventive care, family planning, and ways to prevent chronic diseases.  Shots (vaccines)   HPV shots (up to age 26), if you've never had them before.  Hepatitis B shots (up to age 59), if you've never had them before.  COVID-19 shot: Get this shot when it's due.  Flu shot: Get a flu shot every year.  Tetanus shot: Get a tetanus shot every 10 years.  Pneumococcal, hepatitis A, and RSV shots: Ask your care team if you need these based on your risk.  Shingles shot (for age 50 and up)  General health tests  Diabetes screening:  Starting at age 35, Get screened for diabetes at least every 3 years.  If you are younger than age 35, ask your care team if you should be screened for diabetes.  Cholesterol test: At age 39, start having a cholesterol test every 5 years, or more often if advised.  Bone density scan (DEXA): At age 50, ask your care team if you should have this scan for osteoporosis (brittle bones).  Hepatitis C: Get tested at least once in your life.  STIs  (sexually transmitted infections)  Before age 24: Ask your care team if you should be screened for STIs.  After age 24: Get screened for STIs if you're at risk. You are at risk for STIs (including HIV) if:  You are sexually active with more than one person.  You don't use condoms every time.  You or a partner was diagnosed with a sexually transmitted infection.  If you are at risk for HIV, ask about PrEP medicine to prevent HIV.  Get tested for HIV at least once in your life, whether you are at risk for HIV or not.  Cancer screening tests  Cervical cancer screening: If you have a cervix, begin getting regular cervical cancer screening tests starting at age 21.  Breast cancer scan (mammogram): If you've ever had breasts, begin having regular mammograms starting at age 40. This is a scan to check for breast cancer.  Colon cancer screening: It is important to start screening for colon cancer at age 45.  Have a colonoscopy test every 10 years (or more often if you're at risk) Or, ask your provider about stool tests like a FIT test every year or Cologuard test every 3 years.  To learn more about your testing options, visit:   .  For help making a decision, visit:   https://bit.ly/xl92074.  Prostate cancer screening test: If you have a prostate, ask your care team if a prostate cancer screening test (PSA) at age 55 is right for you.  Lung cancer screening: If you are a current or former smoker ages 50 to 80, ask your care team if ongoing lung cancer screenings are right for you.  For informational purposes only. Not to replace the advice of your health care provider. Copyright   2023 [facility]. All rights reserved. Clinically reviewed by the [facility]. [employer] [id number] - REV 01/24.  Learning About Stress  What is stress?     Stress is your body's response to a hard situation. Your body can have a physical, emotional, or mental response. Stress is a fact of life for most people,  and it affects everyone differently. What causes stress for you may not be stressful for someone else.  A lot of things can cause stress. You may feel stress when you go on a job interview, take a test, or run a race. This kind of short-term stress is normal and even useful. It can help you if you need to work hard or react quickly. For example, stress can help you finish an important job on time.  Long-term stress is caused by ongoing stressful situations or events. Examples of long-term stress include long-term health problems, ongoing problems at work, or conflicts in your family. Long-term stress can harm your health.  How does stress affect your health?  When you are stressed, your body responds as though you are in danger. It makes hormones that speed up your heart, make you breathe faster, and give you a burst of energy. This is called the fight-or-flight stress response. If the stress is over quickly, your body goes back to normal and no harm is done.  But if stress happens too often or lasts too long, it can have bad effects. Long-term stress can make you more likely to get sick, and it can make symptoms of some diseases worse. If you tense up when you are stressed, you may develop neck, shoulder, or low back pain. Stress is linked to high blood pressure and heart disease.  Stress also harms your emotional health. It can make you figueroa, tense, or depressed. Your relationships may suffer, and you may not do well at work or school.  What can you do to manage stress?  You can try these things to help manage stress:   Do something active. Exercise or activity can help reduce stress. Walking is a great way to get started. Even everyday activities such as housecleaning or yard work can help.  Try yoga or tammie chi. These techniques combine exercise and meditation. You may need some training at first to learn them.  Do something you enjoy. For example, listen to music or go to a movie. Practice your hobby or do  "volunteer work.  Meditate. This can help you relax, because you are not worrying about what happened before or what may happen in the future.  Do guided imagery. Imagine yourself in any setting that helps you feel calm. You can use online videos, books, or a teacher to guide you.  Do breathing exercises. For example:  From a standing position, bend forward from the waist with your knees slightly bent. Let your arms dangle close to the floor.  Breathe in slowly and deeply as you return to a standing position. Roll up slowly and lift your head last.  Hold your breath for just a few seconds in the standing position.  Breathe out slowly and bend forward from the waist.  Let your feelings out. Talk, laugh, cry, and express anger when you need to. Talking with supportive friends or family, a counselor, or a marion leader about your feelings is a healthy way to relieve stress. Avoid discussing your feelings with people who make you feel worse.  Write. It may help to write about things that are bothering you. This helps you find out how much stress you feel and what is causing it. When you know this, you can find better ways to cope.  What can you do to prevent stress?  You might try some of these things to help prevent stress:  Manage your time. This helps you find time to do the things you want and need to do.  Get enough sleep. Your body recovers from the stresses of the day while you are sleeping.  Get support. Your family, friends, and community can make a difference in how you experience stress.  Limit your news feed. Avoid or limit time on social media or news that may make you feel stressed.  Do something active. Exercise or activity can help reduce stress. Walking is a great way to get started.  Where can you learn more?  Go to https://www.healthwise.net/patiented  Enter N032 in the search box to learn more about \"Learning About Stress.\"  Current as of: October 24, 2023  Content Version: 14.3    2024 [employer] " [employer].   Care instructions adapted under license by your healthcare professional. If you have questions about a medical condition or this instruction, always ask your healthcare professional. [facility] [employer], [employer] disclaims any warranty or liability for your use of this information.